# Patient Record
Sex: FEMALE | Race: WHITE | NOT HISPANIC OR LATINO | Employment: UNEMPLOYED | URBAN - METROPOLITAN AREA
[De-identification: names, ages, dates, MRNs, and addresses within clinical notes are randomized per-mention and may not be internally consistent; named-entity substitution may affect disease eponyms.]

---

## 2023-09-28 ENCOUNTER — HOSPITAL ENCOUNTER (INPATIENT)
Facility: HOSPITAL | Age: 43
LOS: 20 days | Discharge: HOME | DRG: 885 | End: 2023-10-18
Attending: STUDENT IN AN ORGANIZED HEALTH CARE EDUCATION/TRAINING PROGRAM | Admitting: STUDENT IN AN ORGANIZED HEALTH CARE EDUCATION/TRAINING PROGRAM
Payer: COMMERCIAL

## 2023-09-28 ENCOUNTER — HOSPITAL ENCOUNTER (EMERGENCY)
Facility: HOSPITAL | Age: 43
End: 2023-09-28
Attending: EMERGENCY MEDICINE
Payer: COMMERCIAL

## 2023-09-28 VITALS
TEMPERATURE: 98.2 F | OXYGEN SATURATION: 98 % | DIASTOLIC BLOOD PRESSURE: 75 MMHG | WEIGHT: 136 LBS | BODY MASS INDEX: 25.03 KG/M2 | SYSTOLIC BLOOD PRESSURE: 113 MMHG | RESPIRATION RATE: 16 BRPM | HEIGHT: 62 IN | HEART RATE: 86 BPM

## 2023-09-28 DIAGNOSIS — F41.9 ANXIETY: Primary | ICD-10-CM

## 2023-09-28 DIAGNOSIS — I10 HYPERTENSION: ICD-10-CM

## 2023-09-28 DIAGNOSIS — F32.A DEPRESSION: ICD-10-CM

## 2023-09-28 DIAGNOSIS — F31.9 BIPOLAR DISORDER (HCC): Primary | ICD-10-CM

## 2023-09-28 DIAGNOSIS — F41.9 ANXIETY: ICD-10-CM

## 2023-09-28 LAB
AMPHETAMINES SERPL QL SCN: NEGATIVE
BARBITURATES UR QL: NEGATIVE
BENZODIAZ UR QL: NEGATIVE
COCAINE UR QL: NEGATIVE
ETHANOL EXG-MCNC: 0 MG/DL
EXT PREGNANCY TEST URINE: NEGATIVE
EXT. CONTROL: NORMAL
OPIATES UR QL SCN: NEGATIVE
OXYCODONE+OXYMORPHONE UR QL SCN: NEGATIVE
PCP UR QL: NEGATIVE
THC UR QL: NEGATIVE

## 2023-09-28 PROCEDURE — 82075 ASSAY OF BREATH ETHANOL: CPT | Performed by: EMERGENCY MEDICINE

## 2023-09-28 PROCEDURE — 81025 URINE PREGNANCY TEST: CPT | Performed by: EMERGENCY MEDICINE

## 2023-09-28 PROCEDURE — 99284 EMERGENCY DEPT VISIT MOD MDM: CPT

## 2023-09-28 PROCEDURE — 80307 DRUG TEST PRSMV CHEM ANLYZR: CPT | Performed by: EMERGENCY MEDICINE

## 2023-09-28 PROCEDURE — 99285 EMERGENCY DEPT VISIT HI MDM: CPT | Performed by: EMERGENCY MEDICINE

## 2023-09-28 RX ORDER — LORAZEPAM 2 MG/ML
2 INJECTION INTRAMUSCULAR EVERY 6 HOURS PRN
Status: CANCELLED | OUTPATIENT
Start: 2023-09-28

## 2023-09-28 RX ORDER — HALOPERIDOL 5 MG/ML
2.5 INJECTION INTRAMUSCULAR
Status: DISCONTINUED | OUTPATIENT
Start: 2023-09-28 | End: 2023-10-18 | Stop reason: HOSPADM

## 2023-09-28 RX ORDER — BISACODYL 10 MG
10 SUPPOSITORY, RECTAL RECTAL DAILY PRN
Status: CANCELLED | OUTPATIENT
Start: 2023-09-28

## 2023-09-28 RX ORDER — PROPRANOLOL HCL 60 MG
60 CAPSULE, EXTENDED RELEASE 24HR ORAL DAILY
Status: ON HOLD | COMMUNITY

## 2023-09-28 RX ORDER — BISACODYL 10 MG
10 SUPPOSITORY, RECTAL RECTAL DAILY PRN
Status: DISCONTINUED | OUTPATIENT
Start: 2023-09-28 | End: 2023-10-18 | Stop reason: HOSPADM

## 2023-09-28 RX ORDER — HALOPERIDOL 5 MG/1
5 TABLET ORAL
Status: DISCONTINUED | OUTPATIENT
Start: 2023-09-28 | End: 2023-10-18 | Stop reason: HOSPADM

## 2023-09-28 RX ORDER — LORAZEPAM 2 MG/ML
1 INJECTION INTRAMUSCULAR
Status: CANCELLED | OUTPATIENT
Start: 2023-09-28

## 2023-09-28 RX ORDER — AMOXICILLIN 250 MG
1 CAPSULE ORAL DAILY PRN
Status: DISCONTINUED | OUTPATIENT
Start: 2023-09-28 | End: 2023-10-18 | Stop reason: HOSPADM

## 2023-09-28 RX ORDER — LANOLIN ALCOHOL/MO/W.PET/CERES
3 CREAM (GRAM) TOPICAL
Status: CANCELLED | OUTPATIENT
Start: 2023-09-28

## 2023-09-28 RX ORDER — TRAZODONE HYDROCHLORIDE 50 MG/1
50 TABLET ORAL
Status: CANCELLED | OUTPATIENT
Start: 2023-09-28

## 2023-09-28 RX ORDER — HALOPERIDOL 5 MG/1
2.5 TABLET ORAL
Status: DISCONTINUED | OUTPATIENT
Start: 2023-09-28 | End: 2023-10-18 | Stop reason: HOSPADM

## 2023-09-28 RX ORDER — HYDROXYZINE 50 MG/1
100 TABLET, FILM COATED ORAL
Status: DISCONTINUED | OUTPATIENT
Start: 2023-09-28 | End: 2023-09-29

## 2023-09-28 RX ORDER — HYDROXYZINE HYDROCHLORIDE 25 MG/1
50 TABLET, FILM COATED ORAL
Status: CANCELLED | OUTPATIENT
Start: 2023-09-28

## 2023-09-28 RX ORDER — LISINOPRIL 10 MG/1
10 TABLET ORAL DAILY
Status: DISCONTINUED | OUTPATIENT
Start: 2023-09-29 | End: 2023-10-18 | Stop reason: HOSPADM

## 2023-09-28 RX ORDER — HYDROXYZINE HYDROCHLORIDE 25 MG/1
100 TABLET, FILM COATED ORAL
Status: CANCELLED | OUTPATIENT
Start: 2023-09-28

## 2023-09-28 RX ORDER — HYDROXYZINE HYDROCHLORIDE 25 MG/1
25 TABLET, FILM COATED ORAL
Status: DISCONTINUED | OUTPATIENT
Start: 2023-09-28 | End: 2023-10-03

## 2023-09-28 RX ORDER — BENZTROPINE MESYLATE 1 MG/ML
0.5 INJECTION INTRAMUSCULAR; INTRAVENOUS
Status: DISCONTINUED | OUTPATIENT
Start: 2023-09-28 | End: 2023-10-18 | Stop reason: HOSPADM

## 2023-09-28 RX ORDER — TRAZODONE HYDROCHLORIDE 50 MG/1
50 TABLET ORAL
Status: DISCONTINUED | OUTPATIENT
Start: 2023-09-28 | End: 2023-10-18 | Stop reason: HOSPADM

## 2023-09-28 RX ORDER — HALOPERIDOL 1 MG/1
1 TABLET ORAL EVERY 6 HOURS PRN
Status: DISCONTINUED | OUTPATIENT
Start: 2023-09-28 | End: 2023-10-18 | Stop reason: HOSPADM

## 2023-09-28 RX ORDER — BENZTROPINE MESYLATE 0.5 MG/1
1 TABLET ORAL
Status: CANCELLED | OUTPATIENT
Start: 2023-09-28

## 2023-09-28 RX ORDER — ACETAMINOPHEN 325 MG/1
975 TABLET ORAL EVERY 6 HOURS PRN
Status: DISCONTINUED | OUTPATIENT
Start: 2023-09-28 | End: 2023-10-18 | Stop reason: HOSPADM

## 2023-09-28 RX ORDER — DIPHENHYDRAMINE HYDROCHLORIDE 50 MG/ML
50 INJECTION INTRAMUSCULAR; INTRAVENOUS EVERY 6 HOURS PRN
Status: DISCONTINUED | OUTPATIENT
Start: 2023-09-28 | End: 2023-10-03

## 2023-09-28 RX ORDER — LORAZEPAM 2 MG/ML
2 INJECTION INTRAMUSCULAR EVERY 6 HOURS PRN
Status: DISCONTINUED | OUTPATIENT
Start: 2023-09-28 | End: 2023-10-03

## 2023-09-28 RX ORDER — HYDROXYZINE 50 MG/1
50 TABLET, FILM COATED ORAL
Status: DISCONTINUED | OUTPATIENT
Start: 2023-09-28 | End: 2023-10-03

## 2023-09-28 RX ORDER — MAGNESIUM HYDROXIDE/ALUMINUM HYDROXICE/SIMETHICONE 120; 1200; 1200 MG/30ML; MG/30ML; MG/30ML
30 SUSPENSION ORAL EVERY 4 HOURS PRN
Status: DISCONTINUED | OUTPATIENT
Start: 2023-09-28 | End: 2023-10-18 | Stop reason: HOSPADM

## 2023-09-28 RX ORDER — ACETAMINOPHEN 325 MG/1
650 TABLET ORAL EVERY 4 HOURS PRN
Status: DISCONTINUED | OUTPATIENT
Start: 2023-09-28 | End: 2023-10-18 | Stop reason: HOSPADM

## 2023-09-28 RX ORDER — BENZTROPINE MESYLATE 1 MG/ML
1 INJECTION INTRAMUSCULAR; INTRAVENOUS
Status: DISCONTINUED | OUTPATIENT
Start: 2023-09-28 | End: 2023-10-18 | Stop reason: HOSPADM

## 2023-09-28 RX ORDER — ACETAMINOPHEN 325 MG/1
650 TABLET ORAL EVERY 4 HOURS PRN
Status: CANCELLED | OUTPATIENT
Start: 2023-09-28

## 2023-09-28 RX ORDER — ACETAMINOPHEN 325 MG/1
650 TABLET ORAL EVERY 6 HOURS PRN
Status: CANCELLED | OUTPATIENT
Start: 2023-09-28

## 2023-09-28 RX ORDER — LORAZEPAM 2 MG/ML
2 INJECTION INTRAMUSCULAR
Status: DISCONTINUED | OUTPATIENT
Start: 2023-09-28 | End: 2023-10-18 | Stop reason: HOSPADM

## 2023-09-28 RX ORDER — ACETAMINOPHEN 325 MG/1
975 TABLET ORAL EVERY 6 HOURS PRN
Status: CANCELLED | OUTPATIENT
Start: 2023-09-28

## 2023-09-28 RX ORDER — HALOPERIDOL 10 MG/1
5 TABLET ORAL
Status: CANCELLED | OUTPATIENT
Start: 2023-09-28

## 2023-09-28 RX ORDER — POLYETHYLENE GLYCOL 3350 17 G/17G
17 POWDER, FOR SOLUTION ORAL DAILY PRN
Status: CANCELLED | OUTPATIENT
Start: 2023-09-28

## 2023-09-28 RX ORDER — LANOLIN ALCOHOL/MO/W.PET/CERES
3 CREAM (GRAM) TOPICAL
Status: DISCONTINUED | OUTPATIENT
Start: 2023-09-28 | End: 2023-09-29

## 2023-09-28 RX ORDER — POLYETHYLENE GLYCOL 3350 17 G/17G
17 POWDER, FOR SOLUTION ORAL DAILY PRN
Status: DISCONTINUED | OUTPATIENT
Start: 2023-09-28 | End: 2023-10-18 | Stop reason: HOSPADM

## 2023-09-28 RX ORDER — ACETAMINOPHEN 325 MG/1
650 TABLET ORAL EVERY 6 HOURS PRN
Status: DISCONTINUED | OUTPATIENT
Start: 2023-09-28 | End: 2023-10-18 | Stop reason: HOSPADM

## 2023-09-28 RX ORDER — LISINOPRIL 10 MG/1
10 TABLET ORAL DAILY
Status: CANCELLED | OUTPATIENT
Start: 2023-09-29

## 2023-09-28 RX ORDER — LORAZEPAM 1 MG/1
1 TABLET ORAL ONCE
Status: COMPLETED | OUTPATIENT
Start: 2023-09-28 | End: 2023-09-28

## 2023-09-28 RX ORDER — BENZTROPINE MESYLATE 1 MG/ML
1 INJECTION INTRAMUSCULAR; INTRAVENOUS
Status: CANCELLED | OUTPATIENT
Start: 2023-09-28

## 2023-09-28 RX ORDER — LISINOPRIL 10 MG/1
10 TABLET ORAL DAILY
Status: ON HOLD | COMMUNITY
Start: 2023-08-05

## 2023-09-28 RX ORDER — LORAZEPAM 2 MG/ML
1 INJECTION INTRAMUSCULAR
Status: DISCONTINUED | OUTPATIENT
Start: 2023-09-28 | End: 2023-10-18 | Stop reason: HOSPADM

## 2023-09-28 RX ORDER — AMOXICILLIN 250 MG
1 CAPSULE ORAL DAILY PRN
Status: CANCELLED | OUTPATIENT
Start: 2023-09-28

## 2023-09-28 RX ORDER — HYDROXYZINE HYDROCHLORIDE 25 MG/1
25 TABLET, FILM COATED ORAL
Status: CANCELLED | OUTPATIENT
Start: 2023-09-28

## 2023-09-28 RX ORDER — BENZTROPINE MESYLATE 1 MG/1
1 TABLET ORAL
Status: DISCONTINUED | OUTPATIENT
Start: 2023-09-28 | End: 2023-10-18 | Stop reason: HOSPADM

## 2023-09-28 RX ORDER — DIPHENHYDRAMINE HYDROCHLORIDE 50 MG/ML
50 INJECTION INTRAMUSCULAR; INTRAVENOUS EVERY 6 HOURS PRN
Status: CANCELLED | OUTPATIENT
Start: 2023-09-28

## 2023-09-28 RX ORDER — HALOPERIDOL 5 MG/ML
5 INJECTION INTRAMUSCULAR
Status: CANCELLED | OUTPATIENT
Start: 2023-09-28

## 2023-09-28 RX ORDER — BENZTROPINE MESYLATE 1 MG/ML
0.5 INJECTION INTRAMUSCULAR; INTRAVENOUS
Status: CANCELLED | OUTPATIENT
Start: 2023-09-28

## 2023-09-28 RX ORDER — HALOPERIDOL 2 MG/1
1 TABLET ORAL EVERY 6 HOURS PRN
Status: CANCELLED | OUTPATIENT
Start: 2023-09-28

## 2023-09-28 RX ORDER — HALOPERIDOL 5 MG/ML
2.5 INJECTION INTRAMUSCULAR
Status: CANCELLED | OUTPATIENT
Start: 2023-09-28

## 2023-09-28 RX ORDER — HALOPERIDOL 5 MG/ML
5 INJECTION INTRAMUSCULAR
Status: DISCONTINUED | OUTPATIENT
Start: 2023-09-28 | End: 2023-10-18 | Stop reason: HOSPADM

## 2023-09-28 RX ORDER — MAGNESIUM HYDROXIDE/ALUMINUM HYDROXICE/SIMETHICONE 120; 1200; 1200 MG/30ML; MG/30ML; MG/30ML
30 SUSPENSION ORAL EVERY 4 HOURS PRN
Status: CANCELLED | OUTPATIENT
Start: 2023-09-28

## 2023-09-28 RX ORDER — LORAZEPAM 2 MG/ML
2 INJECTION INTRAMUSCULAR
Status: CANCELLED | OUTPATIENT
Start: 2023-09-28

## 2023-09-28 RX ADMIN — LORAZEPAM 1 MG: 1 TABLET ORAL at 18:08

## 2023-09-28 RX ADMIN — TRAZODONE HYDROCHLORIDE 50 MG: 50 TABLET ORAL at 22:06

## 2023-09-28 NOTE — ED NOTES
44 y.o female presented to the ED with depression and anxiety. Pt stated worsening depression and anxiety since the birth of her child for the past year and a half. Pt stated she has no motivation and energy to do daily task and to take care of her child. Pt's has a supportive  who helps take care of the Child. Patient loss of employment is another stressor. Pt stated she had trauma as a child. Pt stated she hasn't been taking her psych medications for the past two months. Pt stated her medcations have not been effective since the birth of her child. Pt denies SI/HI and A/V hallucinations. Pt was inpatient for mental health x2 at [facility] where pt was diagnosed with Bipolar. Pt currently has no outpatient Psychiatrist or Therapist. Pt denies any legal or substance abuse. Pt stated she wants to go inpatient and will sign a [place] Provider is in agreement with the 201 for inpatient treatment.

## 2023-09-28 NOTE — ED NOTES
Insurance Authorization for admission:   Clinicals and Authorization Request Form faxed to [facility] ([phone]) Clinical Services Utilization Management for Insurance prior Auth.

## 2023-09-28 NOTE — ED NOTES
Patient is accepted at [facility]. Patient is accepted by [facility] per Black Munoz. Transportation is arranged with [facility]. Transportation is scheduled for 2130 CTS. Patient may go to the floor at 2130. Nurse report is to be called to [phone] prior to patient transfer.

## 2023-09-28 NOTE — ED PROVIDER NOTES
History  Chief Complaint   Patient presents with    Depression     Patient presents to the ED with c/o depression and anxiety for the past year and a half since the birth of her child. States she has not been taking her psych meds for the past two months, denies SI or HI      Patient is 44year old female who presents with worsening anxiety and depression. Patient reports that she has been struggling since having the baby. She has a history of anxiety and depression that has been worsening despite an inpatient hospitalization and multiple medication adjustments. Reports that her psychiatrist recommended that she go inpatient at this time. Patient denies any suicidal or homicidal ideations. She denies any auditory visual hallucinations. She states that her anxiety has made her debilitated and she feels like she cannot do anything. She states that she is not bonding with her child, but does not have any thoughts of harming the child. Prior to Admission Medications   Prescriptions Last Dose Informant Patient Reported? Taking?   lisinopril (ZESTRIL) 10 mg tablet   Yes Yes   Sig: Take 10 mg by mouth daily   propranolol (INDERAL LA) 60 mg 24 hr capsule   Yes Yes   Sig: Take 60 mg by mouth daily      Facility-Administered Medications: None       Past Medical History:   Diagnosis Date    Psychiatric disorder        History reviewed. No pertinent surgical history. History reviewed. No pertinent family history. I have reviewed and agree with the history as documented. E-Cigarette/Vaping     E-Cigarette/Vaping Substances     Social History     Tobacco Use    Smoking status: Never    Smokeless tobacco: Never   Substance Use Topics    Alcohol use: Not Currently    Drug use: Not Currently       Review of Systems   Psychiatric/Behavioral:  Negative for suicidal ideas. Physical Exam  Physical Exam  Vitals and nursing note reviewed. Constitutional:       General: She is not in acute distress. Appearance: Normal appearance. She is not ill-appearing, toxic-appearing or diaphoretic. HENT:      Head: Normocephalic and atraumatic. Mouth/Throat:      Mouth: Mucous membranes are moist.   Eyes:      Conjunctiva/sclera: Conjunctivae normal.      Pupils: Pupils are equal, round, and reactive to light. Cardiovascular:      Rate and Rhythm: Normal rate and regular rhythm. Pulses: Normal pulses. Heart sounds: Normal heart sounds. No murmur heard. Pulmonary:      Effort: Pulmonary effort is normal. No respiratory distress. Breath sounds: Normal breath sounds. No stridor. No wheezing, rhonchi or rales. Chest:      Chest wall: No tenderness. Skin:     General: Skin is warm and dry. Neurological:      General: No focal deficit present. Mental Status: She is alert and oriented to person, place, and time. Mental status is at baseline. Psychiatric:         Mood and Affect: Affect is flat and tearful. Thought Content: Thought content does not include homicidal or suicidal ideation. Thought content does not include homicidal or suicidal plan.          Vital Signs  ED Triage Vitals [09/28/23 1500]   Temperature Pulse Respirations Blood Pressure SpO2   98.2 °F (36.8 °C) 85 18 130/80 100 %      Temp Source Heart Rate Source Patient Position - Orthostatic VS BP Location FiO2 (%)   Temporal Monitor Sitting Left arm --      Pain Score       --           Vitals:    09/28/23 1500 09/28/23 1623   BP: 130/80 122/86   Pulse: 85 73   Patient Position - Orthostatic VS: Sitting Sitting         Visual Acuity      ED Medications  Medications   LORazepam (ATIVAN) tablet 1 mg (has no administration in time range)       Diagnostic Studies  Results Reviewed       Procedure Component Value Units Date/Time    Rapid drug screen, urine [[id number]] Collected: 09/28/23 1537    Lab Status: Final result Specimen: Urine, Clean Catch Updated: 09/28/23 1631     Amph/Meth UR Negative     Barbiturate Ur Negative Benzodiazepine Urine Negative     Cocaine Urine Negative     Methadone Urine --     Opiate Urine Negative     PCP Ur Negative     THC Urine Negative     Oxycodone Urine Negative    Narrative:      No Methadone test performed; if required call laboratory. FOR MEDICAL PURPOSES ONLY. IF CONFIRMATION NEEDED PLEASE CONTACT THE LAB WITHIN 5 DAYS. Drug Screen Cutoff Levels:  AMPHETAMINE/METHAMPHETAMINES  1000 ng/mL  BARBITURATES     200 ng/mL  BENZODIAZEPINES     200 ng/mL  COCAINE      300 ng/mL  METHADONE      300 ng/mL  OPIATES      300 ng/mL  PHENCYCLIDINE     25 ng/mL  THC       50 ng/mL  OXYCODONE      100 ng/mL    POCT pregnancy, urine [[id number]]  (Normal) Resulted: 09/28/23 1554    Lab Status: Final result Updated: 09/28/23 1554     EXT Preg Test, Ur Negative     Control Valid    POCT alcohol breath test [[id number]]  (Normal) Resulted: 09/28/23 1505    Lab Status: Final result Updated: 09/28/23 1505     EXTBreath Alcohol 0.000                   No orders to display              Procedures  Procedures         ED Course  ED Course as of 09/28/23 1808   Thu Sep 28, 2023   1637 201 signed. SBIRT 22yo+      Flowsheet Row Most Recent Value   Initial Alcohol Screen: US AUDIT-C     1. How often do you have a drink containing alcohol? 0 Filed at: 09/28/2023 1504   2. How many drinks containing alcohol do you have on a typical day you are drinking? 0 Filed at: 09/28/2023 1504   3a. Male UNDER 65: How often do you have five or more drinks on one occasion? 0 Filed at: 09/28/2023 1504   3b. FEMALE Any Age, or MALE 65+: How often do you have 4 or more drinks on one occassion? 0 Filed at: 09/28/2023 1504   Audit-C Score 0 Filed at: 09/28/2023 1504   JANEEN: How many times in the past year have you. .. Used an illegal drug or used a prescription medication for non-medical reasons?  Never Filed at: 09/28/2023 1504                      Medical Decision Making  Assessment and Plan:   Worsening anxiety and depression. Has gone through multiple med adjustments which are not working according to the patient. Patient's psychiatrist recommending that she go inpatient. Patient amenable. No grounds for involuntary commitment. Of medical records from outside hospital shows that in November 2022 patient had diagnoses of bipolar disorder, postpartum depression, failure to thrive in an adult, delusions. Amount and/or Complexity of Data Reviewed  Labs: ordered. Risk  Prescription drug management. Decision regarding hospitalization. Disposition  Final diagnoses:   Anxiety   Depression     Time reflects when diagnosis was documented in both MDM as applicable and the Disposition within this note       Time User Action Codes Description Comment    9/28/2023  3:33 PM Jayne Harrison [F41.9] Anxiety     9/28/2023  3:33 PM Harman Juares Add [place]. A] Depression           ED Disposition       ED Disposition   Transfer to Behavioral Health Condition   --    Date/Time   Thu Sep 28, 2023 1533    Comment   Albino Flores has been medically cleared and is pending crisis evaluation.                MD Documentation      [facility] Most Recent Value   Patient Condition The patient has been stabilized such that within reasonable medical probability, no material deterioration of the patient condition or the condition of the unborn child(bernie) is likely to result from the transfer   Reason for Transfer Level of Care needed not available at this facility   Benefits of Transfer Specialized equipment and/or services available at the receiving facility (Include comment)________________________   Risks of Transfer Potential for delay in receiving treatment   Accepting Physician Dr Lucia Khan Name, Bob Law [place]    (Name & Tel number) [facility]   Sending MD Dr Harman Juares, DO   Provider Certification The patient is stable for psychiatric transfer because they are medically stable, and is protected from harming him/herself or others during transport          RN Documentation      [address] Name, [address], [place]    (Name & Tel number) [facility]          Follow-up Information    None         Patient's Medications   Discharge Prescriptions    No medications on file       No discharge procedures on file.     PDMP Review       None            ED Provider  Electronically Signed by             Axel Alcaraz,   09/28/23 [address] [place] [address]  09/28/23 1936

## 2023-09-28 NOTE — EMTALA/ACUTE CARE TRANSFER
[facility] [facility] EMERGENCY DEPARTMENT  [address]  [facility] [address]  Dept: [phone]      MIRLNI TRANSFER CONSENT    NAME Chasidy Parish                                         1980                              MRN [id number]    I have been informed of my rights regarding examination, treatment, and transfer   by Dr. Juana Cohen DO    Benefits: Specialized equipment and/or services available at the receiving facility (Include comment)________________________    Risks: Potential for delay in receiving treatment      Consent for Transfer:  I acknowledge that my medical condition has been evaluated and explained to me by the emergency department physician or other qualified medical person and/or my attending physician, who has recommended that I be transferred to the service of  Accepting Physician: Dr Yonatan Leo at [address] [place] [address] [address] Name, [address] [place] [address] Street : [place], [place]. The above potential benefits of such transfer, the potential risks associated with such transfer, and the probable risks of not being transferred have been explained to me, and I fully understand them. The doctor has explained that, in my case, the benefits of transfer outweigh the risks. I agree to be transferred. I authorize the performance of emergency medical procedures and treatments upon me in both transit and upon arrival at the receiving facility. Additionally, I authorize the release of any and all medical records to the receiving facility and request they be transported with me, if possible. I understand that the safest mode of transportation during a medical emergency is an ambulance and that the Hospital advocates the use of this mode of transport. Risks of traveling to the receiving facility by car, including absence of medical control, life sustaining equipment, such as oxygen, and medical personnel has been explained to me and I fully understand them.     ([address])  [ ]  I consent to the stated transfer and to be transported by ambulance/helicopter. [  ]  I consent to the stated transfer, but refuse transportation by ambulance and accept full responsibility for my transportation by car. I understand the risks of non-ambulance transfers and I exonerate the Hospital and its staff from any deterioration in my condition that results from this refusal.    X___________________________________________    DATE  23  TIME________  Signature of patient or legally responsible individual signing on patient behalf           RELATIONSHIP TO PATIENT_________________________          Provider Certification    NAME Storm Charlton                                         1980                              MRN [id number]    A medical screening exam was performed on the above named patient. Based on the examination:    Condition Necessitating Transfer The primary encounter diagnosis was Anxiety. A diagnosis of Depression was also pertinent to this visit. Patient Condition: The patient has been stabilized such that within reasonable medical probability, no material deterioration of the patient condition or the condition of the unborn child(bernie) is likely to result from the transfer    Reason for Transfer: Level of Care needed not available at this facility    Transfer Requirements: [address], [address] available and qualified personnel available for treatment as acknowledged by Rosaline  Agreed to accept transfer and to provide appropriate medical treatment as acknowledged by       Dr Garrick Kennedy  Appropriate medical records of the examination and treatment of the patient are provided at the time of transfer   [address] _______  Transfer will be performed by qualified personnel from    and appropriate transfer equipment as required, including the use of necessary and appropriate life support measures.     Provider Certification: I have examined the patient and explained the following risks and benefits of being transferred/refusing transfer to the patient/family:  The patient is stable for psychiatric transfer because they are medically stable, and is protected from harming him/herself or others during transport      Based on these reasonable risks and benefits to the patient and/or the unborn child(bernie), and based upon the information available at the time of the patient’s examination, I certify that the medical benefits reasonably to be expected from the provision of appropriate medical treatments at another medical facility outweigh the increasing risks, if any, to the individual’s medical condition, and in the case of labor to the unborn child, from effecting the transfer.     X____________________________________________ DATE 09/28/23        TIME_______      ORIGINAL - SEND TO MEDICAL RECORDS   COPY - SEND WITH PATIENT DURING TRANSFER

## 2023-09-29 PROBLEM — N76.0 BV (BACTERIAL VAGINOSIS): Status: ACTIVE | Noted: 2023-09-29

## 2023-09-29 PROBLEM — Z00.8 MEDICAL CLEARANCE FOR PSYCHIATRIC ADMISSION: Status: ACTIVE | Noted: 2023-09-29

## 2023-09-29 PROBLEM — F41.0 GENERALIZED ANXIETY DISORDER WITH PANIC ATTACKS: Status: ACTIVE | Noted: 2023-09-29

## 2023-09-29 PROBLEM — B96.89 BV (BACTERIAL VAGINOSIS): Status: ACTIVE | Noted: 2023-09-29

## 2023-09-29 PROBLEM — F31.9 BIPOLAR DISORDER (HCC): Status: ACTIVE | Noted: 2018-01-01

## 2023-09-29 PROBLEM — E78.00 HYPERCHOLESTEREMIA: Status: ACTIVE | Noted: 2023-09-29

## 2023-09-29 PROBLEM — I10 HYPERTENSION: Status: ACTIVE | Noted: 2018-01-01

## 2023-09-29 PROBLEM — F41.1 GENERALIZED ANXIETY DISORDER WITH PANIC ATTACKS: Status: ACTIVE | Noted: 2023-09-29

## 2023-09-29 LAB
25(OH)D3 SERPL-MCNC: 27.3 NG/ML (ref 30–100)
ALBUMIN SERPL BCP-MCNC: 3.9 G/DL (ref 3.5–5)
ALP SERPL-CCNC: 72 U/L (ref 34–104)
ALT SERPL W P-5'-P-CCNC: 9 U/L (ref 7–52)
ANION GAP SERPL CALCULATED.3IONS-SCNC: 8 MMOL/L
AST SERPL W P-5'-P-CCNC: 15 U/L (ref 13–39)
BACTERIA UR QL AUTO: ABNORMAL /HPF
BASOPHILS # BLD AUTO: 0.07 THOUSANDS/ÂΜL (ref 0–0.1)
BASOPHILS NFR BLD AUTO: 1 % (ref 0–1)
BILIRUB SERPL-MCNC: 0.78 MG/DL (ref 0.2–1)
BILIRUB UR QL STRIP: NEGATIVE
BUN SERPL-MCNC: 15 MG/DL (ref 5–25)
CALCIUM SERPL-MCNC: 9.8 MG/DL (ref 8.4–10.2)
CHLORIDE SERPL-SCNC: 104 MMOL/L (ref 96–108)
CHOLEST SERPL-MCNC: 234 MG/DL
CLARITY UR: ABNORMAL
CO2 SERPL-SCNC: 24 MMOL/L (ref 21–32)
COLOR UR: YELLOW
CREAT SERPL-MCNC: 0.94 MG/DL (ref 0.6–1.3)
EOSINOPHIL # BLD AUTO: 0.13 THOUSAND/ÂΜL (ref 0–0.61)
EOSINOPHIL NFR BLD AUTO: 3 % (ref 0–6)
ERYTHROCYTE [DISTWIDTH] IN BLOOD BY AUTOMATED COUNT: 14.7 % (ref 11.6–15.1)
FOLATE SERPL-MCNC: >22.3 NG/ML
GFR SERPL CREATININE-BSD FRML MDRD: 75 ML/MIN/1.73SQ M
GLUCOSE P FAST SERPL-MCNC: 93 MG/DL (ref 65–99)
GLUCOSE SERPL-MCNC: 93 MG/DL (ref 65–140)
GLUCOSE UR STRIP-MCNC: NEGATIVE MG/DL
HCT VFR BLD AUTO: 43.9 % (ref 34.8–46.1)
HDLC SERPL-MCNC: 68 MG/DL
HGB BLD-MCNC: 14 G/DL (ref 11.5–15.4)
HGB UR QL STRIP.AUTO: NEGATIVE
IMM GRANULOCYTES # BLD AUTO: 0.02 THOUSAND/UL (ref 0–0.2)
IMM GRANULOCYTES NFR BLD AUTO: 0 % (ref 0–2)
KETONES UR STRIP-MCNC: NEGATIVE MG/DL
LDLC SERPL CALC-MCNC: 152 MG/DL (ref 0–100)
LEUKOCYTE ESTERASE UR QL STRIP: ABNORMAL
LYMPHOCYTES # BLD AUTO: 1.45 THOUSANDS/ÂΜL (ref 0.6–4.47)
LYMPHOCYTES NFR BLD AUTO: 29 % (ref 14–44)
MCH RBC QN AUTO: 29 PG (ref 26.8–34.3)
MCHC RBC AUTO-ENTMCNC: 31.9 G/DL (ref 31.4–37.4)
MCV RBC AUTO: 91 FL (ref 82–98)
MONOCYTES # BLD AUTO: 0.4 THOUSAND/ÂΜL (ref 0.17–1.22)
MONOCYTES NFR BLD AUTO: 8 % (ref 4–12)
NEUTROPHILS # BLD AUTO: 3 THOUSANDS/ÂΜL (ref 1.85–7.62)
NEUTS SEG NFR BLD AUTO: 59 % (ref 43–75)
NITRITE UR QL STRIP: NEGATIVE
NON-SQ EPI CELLS URNS QL MICRO: ABNORMAL /HPF
NONHDLC SERPL-MCNC: 166 MG/DL
NRBC BLD AUTO-RTO: 0 /100 WBCS
PH UR STRIP.AUTO: 5.5 [PH]
PLATELET # BLD AUTO: 309 THOUSANDS/UL (ref 149–390)
PMV BLD AUTO: 9.8 FL (ref 8.9–12.7)
POTASSIUM SERPL-SCNC: 4 MMOL/L (ref 3.5–5.3)
PROT SERPL-MCNC: 7.2 G/DL (ref 6.4–8.4)
PROT UR STRIP-MCNC: NEGATIVE MG/DL
RBC # BLD AUTO: 4.82 MILLION/UL (ref 3.81–5.12)
RBC #/AREA URNS AUTO: ABNORMAL /HPF
SODIUM SERPL-SCNC: 136 MMOL/L (ref 135–147)
SP GR UR STRIP.AUTO: 1.02 (ref 1–1.03)
TRIGL SERPL-MCNC: 70 MG/DL
TSH SERPL DL<=0.05 MIU/L-ACNC: 1.51 UIU/ML (ref 0.45–4.5)
UROBILINOGEN UR STRIP-ACNC: <2 MG/DL
VIT B12 SERPL-MCNC: 500 PG/ML (ref 180–914)
WBC # BLD AUTO: 5.07 THOUSAND/UL (ref 4.31–10.16)
WBC #/AREA URNS AUTO: ABNORMAL /HPF

## 2023-09-29 PROCEDURE — 99222 1ST HOSP IP/OBS MODERATE 55: CPT | Performed by: PHYSICIAN ASSISTANT

## 2023-09-29 PROCEDURE — 80053 COMPREHEN METABOLIC PANEL: CPT | Performed by: PSYCHIATRY & NEUROLOGY

## 2023-09-29 PROCEDURE — 82607 VITAMIN B-12: CPT | Performed by: PSYCHIATRY & NEUROLOGY

## 2023-09-29 PROCEDURE — 81001 URINALYSIS AUTO W/SCOPE: CPT | Performed by: PSYCHIATRY & NEUROLOGY

## 2023-09-29 PROCEDURE — 82746 ASSAY OF FOLIC ACID SERUM: CPT | Performed by: PSYCHIATRY & NEUROLOGY

## 2023-09-29 PROCEDURE — 99222 1ST HOSP IP/OBS MODERATE 55: CPT | Performed by: STUDENT IN AN ORGANIZED HEALTH CARE EDUCATION/TRAINING PROGRAM

## 2023-09-29 PROCEDURE — 82306 VITAMIN D 25 HYDROXY: CPT | Performed by: PSYCHIATRY & NEUROLOGY

## 2023-09-29 PROCEDURE — 85025 COMPLETE CBC W/AUTO DIFF WBC: CPT | Performed by: PSYCHIATRY & NEUROLOGY

## 2023-09-29 PROCEDURE — 84443 ASSAY THYROID STIM HORMONE: CPT | Performed by: PSYCHIATRY & NEUROLOGY

## 2023-09-29 PROCEDURE — 80061 LIPID PANEL: CPT | Performed by: PSYCHIATRY & NEUROLOGY

## 2023-09-29 RX ORDER — PROPRANOLOL HCL 60 MG
60 CAPSULE, EXTENDED RELEASE 24HR ORAL DAILY
Status: DISCONTINUED | OUTPATIENT
Start: 2023-09-29 | End: 2023-10-18 | Stop reason: HOSPADM

## 2023-09-29 RX ORDER — LORAZEPAM 0.5 MG/1
0.5 TABLET ORAL EVERY 8 HOURS PRN
Status: DISCONTINUED | OUTPATIENT
Start: 2023-09-29 | End: 2023-10-03

## 2023-09-29 RX ORDER — DESVENLAFAXINE SUCCINATE 50 MG/1
50 TABLET, EXTENDED RELEASE ORAL DAILY
Status: DISCONTINUED | OUTPATIENT
Start: 2023-09-29 | End: 2023-10-18 | Stop reason: HOSPADM

## 2023-09-29 RX ADMIN — CARIPRAZINE 1.5 MG: 1.5 CAPSULE, GELATIN COATED ORAL at 11:23

## 2023-09-29 RX ADMIN — HYDROXYZINE HYDROCHLORIDE 50 MG: 50 TABLET, FILM COATED ORAL at 09:27

## 2023-09-29 RX ADMIN — DESVENLAFAXINE 50 MG: 50 TABLET, FILM COATED, EXTENDED RELEASE ORAL at 11:23

## 2023-09-29 RX ADMIN — TRAZODONE HYDROCHLORIDE 50 MG: 50 TABLET ORAL at 21:12

## 2023-09-29 RX ADMIN — PROPRANOLOL HYDROCHLORIDE 60 MG: 60 CAPSULE, EXTENDED RELEASE ORAL at 11:23

## 2023-09-29 RX ADMIN — LISINOPRIL 10 MG: 10 TABLET ORAL at 09:05

## 2023-09-29 NOTE — ASSESSMENT & PLAN NOTE
Reports she started treatment for this 1 month ago and believes it is still present  Advised outpatient follow-up with OB/GYN adherence to Flagyl regimen  Given invasiveness of testing and patient's current mental state, will forego additional testing while admitted to inpatient behavioral health unit

## 2023-09-29 NOTE — PROGRESS NOTES
New admission. [facility]. Reported increased anxiety and depression which increased after birth of child in September. Pt reported her  is very supportive and taking care of baby. Pt denies SI but reported "feeling down." Pt reported she is interested in ECT. DC: TBD      09/29/23 0756   Team Meeting   Meeting Type Daily Rounds   Team Members Present   Team Members Present Physician;Nurse;; Other (Discipline and Name)   Physician Team Member Dr. Eduardo Corona / Dr. Brenda Gutiérrez / Loida Sullivan / Ariel Majano Team Member Cali Sosa / [facility] Management Team Member Arcadio Amos / Smita Beckman / Mell Hatch   Other (Discipline and Name) Sigmund - Group Facilitator   Patient/Family Present   Patient Present No   Patient's Family Present No

## 2023-09-29 NOTE — ASSESSMENT & PLAN NOTE
No admission labs available  CBC, UA, CMP, vitamin B12, folate, vitamin D 25-hydroxy, lipid panel, TSH labs pending  Vitals stable  UDS negative  COVID-19 negative  No EKG available  Medically stable for continued inpatient psychiatric treatment based on available results  Please contact [facility] with any questions or concerns

## 2023-09-29 NOTE — CMS CERTIFICATION NOTE
Recertification: Based upon physical, mental and social evaluations, I certify that inpatient psychiatric services continue to be medically necessary for this patient for a duration of 7 midnights for the treatment of  Bipolar disorder [facility])   Available alternative community resources still do not meet the patient's mental health care needs. I further attest that an established written individualized plan of care has been updated and is outlined in the patient's medical records.

## 2023-09-29 NOTE — DISCHARGE INSTR - OTHER ORDERS
[address] [facility],[address] [facility] its Crisis and Emergency Services to Daryl Brito and [address] telephone and walk-in component operates from [facility], [facility]. The 24-hour crisis telephone number is [phone]. The mobile component of the operation operates where the Crisis is occurring. About [address] [facility] HELPLINE: [phone]   Super Vitamin D. Foradian      MICHAELLE, the [employer] on Mental Illness, is the nation's largest grassroots mental health organization dedicated to building better lives for the millions of Americans affected by mental illness. [address] is an affiliate of [employer] and dedicated volunteers, members and leaders work tirelessly to raise awareness and provide essential education, advocacy and support group programs for people in our community living with mental illness and their loved ones. MICHAELLE started as a small group of families gathered around a kitchen table in the 1970's but has now grown into the nation's leading voice on mental health. All of our services are free-of-charge. [facility] relies on gifts and contributions to support our important work. MICHAELLE Support Groups  We remain committed to providing you with the support, education, and resources you need to build better lives. The same Group Guidelines and Principles of Support apply for in-person meetings and online meetings. Each MICHAELLE Support Group is led by two MICHAELLE-trained facilitators who are either family members or individuals in recovery themselves. These groups are offered free of charge, as are all [employer]. We have taken measures to ensure the same support you receive in times of wellness, you receive in times of illness. There is help, there is hope, and until there's a cure, there's MICHAELLE.          Family Support Group meets in person on the 2nd Wednesday of each month at [facility], [address]. Park in [employer] lot off [address]. Enter rear glass doors by MICHAELLE sign. The group meets from 7:00pm-8:30pm.  This Support Group is free, confidential, and open to adults age 25 and older who have a family member or loved one with a mental health condition. An RSVP is not required if you plan to attend in person. If you are unable to attend the meeting in person you can also attend by Zoom online or by phone. For questions call [phone] or email [email or web address]. Meets The 2nd And 4th Thursdays, 6:00pm To 7:30pm  Location: [address] [place] [address]. Enter Rear Glass Doors On [place]. Parking Is Available On-Street Or In [address] [facility]; Enter Lot From [employer]. Sarah Sevilla is a peer recovery support group for individuals 18 and over who experience mental health conditions that offers respect, understanding, encouragement, and hope. MICHAELLE Connection groups are:  Free  Held 2nd and 4th Thursday for 90 minutes  Designed to connect, encourage, and support participants  Led by trained facilitators living in recovery themselves  An RSVP is not required. If you are interested in attending or have questions contact: [phone] or [email or web address]

## 2023-09-29 NOTE — PLAN OF CARE
Problem: Ineffective Coping  Goal: Cooperates with admission process  Description: Interventions:   - Complete admission process  Outcome: Completed     Problem: Depression  Goal: Treatment Goal: Demonstrate behavioral control of depressive symptoms, verbalize feelings of improved mood/affect, and adopt new coping skills prior to discharge  Outcome: Progressing  Goal: Verbalize thoughts and feelings  Description: Interventions:  - Assess and re-assess patient's level of risk   - Engage patient in 1:1 interactions, daily, for a minimum of 15 minutes   - Encourage patient to express feelings, fears, frustrations, hopes   Outcome: Progressing  Goal: Refrain from harming self  Description: Interventions:  - Monitor patient closely, per order   - Supervise medication ingestion, monitor effects and side effects   Outcome: Progressing  Goal: Refrain from isolation  Description: Interventions:  - Develop a trusting relationship   - Encourage socialization   Outcome: Progressing  Goal: Refrain from self-neglect  Outcome: Progressing  Goal: Attend and participate in unit activities, including therapeutic, recreational, and educational groups  Description: Interventions:  - Provide therapeutic and educational activities daily, encourage attendance and participation, and document same in the medical record   Outcome: Progressing  Goal: Complete daily ADLs, including personal hygiene independently, as able  Description: Interventions:  - Observe, teach, and assist patient with ADLS  -  Monitor and promote a balance of rest/activity, with adequate nutrition and elimination   Outcome: Progressing

## 2023-09-29 NOTE — DISCHARGE INSTR - APPOINTMENTS
You will be discharged to [address]   You confirmed that your cell phone number is [phone]            Lashawn Razo or Sara, remi Rutledge, will be calling you after your discharge, on the phone number that you provided. They will be available as an additional support, if needed. If you wish to speak with one of them, you may contact Lashawn Razo at [phone] or Thanh Prado at [phone].

## 2023-09-29 NOTE — CONSULTS
[address]  Consult  Name: Marti Black 44 y.o. female I MRN: [id number]  Unit/Bed#: -02 I Date of Admission: 9/28/2023   Date of Service: 9/29/2023 I Hospital Day: 1    Inpatient consult for Medical Clearance for [facility] patient  Consult performed by: Kate Miller PA-C  Consult ordered by: Katelynn Cooney MD          Assessment/Plan   Medical clearance for psychiatric admission  Assessment & Plan  No admission labs available  CBC, UA, CMP, vitamin B12, folate, vitamin D 25-hydroxy, lipid panel, TSH labs pending  Vitals stable  UDS negative  COVID-19 negative  No EKG available  Medically stable for continued inpatient psychiatric treatment based on available results  Please contact SLIM with any questions or concerns    BV (bacterial vaginosis)  Assessment & Plan  Reports she started treatment for this 1 month ago and believes it is still present  Advised outpatient follow-up with OB/GYN adherence to Flagyl regimen  Given invasiveness of testing and patient's current mental state, will forego additional testing while admitted to inpatient behavioral health unit    Hypertension  Assessment & Plan  Maintained on lisinopril and propranolol, continue  Monitor blood pressure per unit protocol    Bipolar disorder [facility])  Assessment & Plan  Management per psychiatry       VTE Prophylaxis: VTE Score: 2 Low Risk (Score 0-2) - Encourage Ambulation. Recommendations for Discharge:  Discharge timeline per primary team.  Routine follow-up with primary care provider.  on cessation from. Total Time Spent on Date of Encounter in care of patient: 35 mins.  This time was spent on one or more of the following: performing physical exam; counseling and coordination of care; obtaining or reviewing history; documenting in the medical record; reviewing/ordering tests, medications or procedures; communicating with other healthcare professionals and discussing with patient's family/caregivers. Collaboration of Care: Were Recommendations Directly Discussed with Primary Treatment Team? No     History of Present Illness:  Armando Galan is a 44 y.o. female who is originally admitted to the behavioral health service due to worsening anxiety and depression. We are consulted for chronic medical conditions. Patient denies any chronic medical conditions for which she takes daily medications. Patient should continue all prior to admission medications as prescribed by primary care provider/outpatient specialists. Patient denies recent travel, illness or sick contacts. Patient denies smoking, drinking or drug use. Available admission lab work and vitals are acceptable. Patient feels a baseline physical health. She reports she began being treated for BV approximately 1 month ago and has failed treatment. She continues to follow with her OB/GYN and has taken Flagyl, advised her to follow-up with OB/GYN office. Patient appears medically stable for inpatient psychiatric treatment at this time. Please contact SLIM with any medical questions or concerns if issues should arise. Review of Systems:  Review of Systems   Psychiatric/Behavioral:  Positive for dysphoric mood. The patient is nervous/anxious. All other systems reviewed and are negative. Past Medical and Surgical History:   Medical History        Past Medical History:   Diagnosis Date    Psychiatric disorder              Surgical History   No past surgical history on file. Meds/Allergies:  PTA meds:   Prior to Admission Medications   Prescriptions Last Dose Informant Patient Reported? Taking?   lisinopril (ZESTRIL) 10 mg tablet     Yes No   Sig: Take 10 mg by mouth daily   propranolol (INDERAL LA) 60 mg 24 hr capsule     Yes No   Sig: Take 60 mg by mouth daily      Facility-Administered Medications: None         Allergies:          Allergies   Allergen Reactions    Amoxicillin Itching and Vomiting Vancomycin Itching       Scalp started itching and burning, redness in the forehead and neck    Clindamycin Itching    Beta Adrenergic Blockers Other (See Comments)         Social History:  Marital Status: /Civil Union  Substance Use History:   Social History          Substance and Sexual Activity   Alcohol Use Not Currently      Social History          Tobacco Use   Smoking Status Never   Smokeless Tobacco Never      Social History          Substance and Sexual Activity   Drug Use Not Currently         Family History:  Family History   History reviewed. No pertinent family history. Physical Exam:   Vitals:   Blood Pressure: 129/87 (09/29/23 0740)  Pulse: 83 (09/29/23 0740)  Temperature: 98.3 °F (36.8 °C) (09/29/23 0740)  Temp Source: Tympanic (09/29/23 0740)  Respirations: 18 (09/29/23 0740)  Height: 5' 1" (154.9 cm) (09/28/23 2135)  Weight - Scale: 64 kg (141 lb) (09/28/23 2135)  SpO2: 99 % (09/29/23 0740)     Physical Exam  Vitals and nursing note reviewed. Constitutional:       General: She is awake. She is not in acute distress. HENT:      Head: Normocephalic and atraumatic. Cardiovascular:      Rate and Rhythm: Normal rate and regular rhythm. Heart sounds: No murmur heard. Pulmonary:      Effort: Pulmonary effort is normal.      Breath sounds: Normal breath sounds. Abdominal:      General: There is no distension. Palpations: Abdomen is soft. Musculoskeletal:      Right lower leg: No edema. Left lower leg: No edema. Skin:     General: Skin is warm and dry. Neurological:      Mental Status: She is alert. Comments: CN II-XII grossly intact   Psychiatric:         Mood and Affect: Mood is anxious. Additional Data:   Lab Results:                     No results found for: "HGBA1C"             Imaging: No pertinent imaging reviewed. No orders to display         EKG, Pathology, and Other Studies Reviewed on Admission:   EKG: No EKG obtained.      ** Please Note: This note may have been constructed using a voice recognition system.  **

## 2023-09-29 NOTE — TREATMENT PLAN
TREATMENT PLAN REVIEW - [facility] Emani Silva 44 y.o. 1980 female MRN: [id number]    Maggie Galarza Room / Bed: [address] 209-02 Encounter: [id number]          Admit Date/Time:  9/28/2023  9:21 PM    Treatment Team: Attending Provider: Jannette Conti MD; Care Manager: Francy Cain RN; Licensed Practical Nurse: Bakari Martin LPN; Registered Nurse: Adelaida Mendosa RN; Patient Care Technician: Cristian Palmer; Patient Care Assistant: Meghana Mcnally; Registered Nurse: Elfreda Peabody, RN; Charge Nurse: Alphonse Byrd RN; : Chino Karimi; Occupational Therapy Assistant: Harjinder Grace IV    Diagnosis: Principal Problem:    Bipolar disorder ([address])  Active Problems:    Hypertension    Hypercholesteremia    BV (bacterial vaginosis)    Medical clearance for psychiatric admission    Generalized anxiety disorder with panic attacks      Patient Strengths/Assets: cooperative, motivation for treatment/growth, patient is on a voluntary commitment    Patient Barriers/Limitations: lack of stable employment, limited support system, resistance to treatment    Short Term Goals: decrease in depressive symptoms, decrease in anxiety symptoms, improvement in insight, sleep improvement, improvement in appetite, mood stabilization    Long Term Goals: improvement in depression, improvement in anxiety, resolution of depressive symptoms, resolution of manic symptoms, stabilization of mood, improved insight, acceptance of need for psychiatric medications, acceptance of need for psychiatric treatment, adequate self care, adequate sleep, adequate appetite    Progress Towards Goals: starting psychiatric medications as prescribed    Recommended Treatment: medication management, patient medication education, group therapy, milieu therapy, continued Behavioral Health psychiatric evaluation/assessment process    Treatment Frequency: daily medication monitoring, group and milieu therapy daily, monitoring through interdisciplinary rounds, monitoring through weekly patient care conferences    Expected Discharge Date:  5-7 days    Discharge Plan: referral for outpatient medication management with a psychiatrist, referral for outpatient psychotherapy    Treatment Plan Created/Updated By: Olive Saucedo MD

## 2023-09-29 NOTE — PROGRESS NOTES
[address] [facility]  Progress Note  Name: Caren Leo  MRN: [id number]  Unit/Bed#: [facility] 209-02 I Date of Admission: 9/28/2023   Date of Service: 9/29/2023 I Hospital Day: 1    Assessment/Plan   Medical clearance for psychiatric admission  Assessment & Plan  No admission labs available  CBC, UA, CMP, vitamin B12, folate, vitamin D 25-hydroxy, lipid panel, TSH labs pending  Vitals stable  UDS negative  COVID-19 negative  No EKG available  Medically stable for continued inpatient psychiatric treatment based on available results  Please contact SLIM with any questions or concerns    BV (bacterial vaginosis)  Assessment & Plan  Reports she started treatment for this 1 month ago and believes it is still present  Advised outpatient follow-up with OB/GYN adherence to Flagyl regimen  Given invasiveness of testing and patient's current mental state, will forego additional testing while admitted to inpatient behavioral health unit    Hypertension  Assessment & Plan  Maintained on lisinopril and propranolol, continue  Monitor blood pressure per unit protocol    Bipolar disorder [facility])  Assessment & Plan  Management per psychiatry           VTE Prophylaxis: VTE Score: 2 Low Risk (Score 0-2) - Encourage Ambulation. Recommendations for Discharge:  Discharge timeline per primary team.  Routine follow-up with primary care provider.  on cessation from. Total Time Spent on Date of Encounter in care of patient: 35 mins. This time was spent on one or more of the following: performing physical exam; counseling and coordination of care; obtaining or reviewing history; documenting in the medical record; reviewing/ordering tests, medications or procedures; communicating with other healthcare professionals and discussing with patient's family/caregivers. Collaboration of Care:  Were Recommendations Directly Discussed with Primary Treatment Team? No    History of Present Illness:  Armando Angelia is a 44 y.o. female who is originally admitted to the behavioral health service due to worsening anxiety and depression. We are consulted for chronic medical conditions. Patient denies any chronic medical conditions for which she takes daily medications. Patient should continue all prior to admission medications as prescribed by primary care provider/outpatient specialists. Patient denies recent travel, illness or sick contacts. Patient denies smoking, drinking or drug use. Available admission lab work and vitals are acceptable. Patient feels a baseline physical health. She reports she began being treated for BV approximately 1 month ago and has failed treatment. She continues to follow with her OB/GYN and has taken Flagyl, advised her to follow-up with OB/GYN office. Patient appears medically stable for inpatient psychiatric treatment at this time. Please contact SLIM with any medical questions or concerns if issues should arise. Review of Systems:  Review of Systems   Psychiatric/Behavioral:  Positive for dysphoric mood. The patient is nervous/anxious. All other systems reviewed and are negative. Past Medical and Surgical History:   Past Medical History:   Diagnosis Date    Psychiatric disorder        No past surgical history on file. Meds/Allergies:  PTA meds:   Prior to Admission Medications   Prescriptions Last Dose Informant Patient Reported? Taking?   lisinopril (ZESTRIL) 10 mg tablet   Yes No   Sig: Take 10 mg by mouth daily   propranolol (INDERAL LA) 60 mg 24 hr capsule   Yes No   Sig: Take 60 mg by mouth daily      Facility-Administered Medications: None       Allergies:    Allergies   Allergen Reactions    Amoxicillin Itching and Vomiting    Vancomycin Itching     Scalp started itching and burning, redness in the forehead and neck    Clindamycin Itching    Beta Adrenergic Blockers Other (See Comments)       Social History:  Marital Status: /Civil Union  Substance Use History: Social History     Substance and Sexual Activity   Alcohol Use Not Currently     Social History     Tobacco Use   Smoking Status Never   Smokeless Tobacco Never     Social History     Substance and Sexual Activity   Drug Use Not Currently       Family History:  History reviewed. No pertinent family history. Physical Exam:   Vitals:   Blood Pressure: 129/87 (09/29/23 0740)  Pulse: 83 (09/29/23 0740)  Temperature: 98.3 °F (36.8 °C) (09/29/23 0740)  Temp Source: Tympanic (09/29/23 0740)  Respirations: 18 (09/29/23 0740)  Height: 5' 1" (154.9 cm) (09/28/23 2135)  Weight - Scale: 64 kg (141 lb) (09/28/23 2135)  SpO2: 99 % (09/29/23 0740)    Physical Exam  Vitals and nursing note reviewed. Constitutional:       General: She is awake. She is not in acute distress. HENT:      Head: Normocephalic and atraumatic. Cardiovascular:      Rate and Rhythm: Normal rate and regular rhythm. Heart sounds: No murmur heard. Pulmonary:      Effort: Pulmonary effort is normal.      Breath sounds: Normal breath sounds. Abdominal:      General: There is no distension. Palpations: Abdomen is soft. Musculoskeletal:      Right lower leg: No edema. Left lower leg: No edema. Skin:     General: Skin is warm and dry. Neurological:      Mental Status: She is alert. Comments: CN II-XII grossly intact   Psychiatric:         Mood and Affect: Mood is anxious. Additional Data:   Lab Results:                    No results found for: "HGBA1C"            Imaging: No pertinent imaging reviewed. No orders to display       EKG, Pathology, and Other Studies Reviewed on Admission:   EKG: No EKG obtained. ** Please Note: This note may have been constructed using a voice recognition system.  **

## 2023-09-29 NOTE — NURSING NOTE
Pt denies SI/HI/AVH but reports ongoing chronic depression. Pt states she has a bipolar diagnosis as well and has been taking medications since she was 18 and they have been mostly ineffective. She presents with a flat affect and has been withdrawn to her room. Pt requested and received 50 mg of atarax for anxiety H: 20 Upon follow up pt reports medication was a little helpful but states she'd rather have Ativan. Pt encouraged to speak with provider about that matter when she meets with them. Pt is also requesting information about ECT.

## 2023-09-29 NOTE — NURSING NOTE
Pt has been pleasant and cooperative, isolative at times, flat affect. Pt reports ongoing depression, denies SI/HI/AVH at this time.

## 2023-09-29 NOTE — PROGRESS NOTES
Diagnosis of  Bipolar disorder reviewed. Short term goals for decrease in depressive symptoms, decrease in anxiety symptoms, improvement in insight, sleep improvement, improvement in appetite, mood stabilization discussed. All parties in agreement and treatment plan signed.     09/29/23 1356   Team Meeting   Meeting Type Tx Team Meeting   Team Members Present   Team Members Present Physician;Nurse;   Physician Team Member Dr. Flavia Holland Team Member [facility] Management Team Member Hakan   Patient/Family Present   Patient Present Yes   Patient's Family Present No

## 2023-09-29 NOTE — NURSING NOTE
Patient presents with worsening anxiety and depression. Patient reports struggling since the birth of her baby in September. Patient has a supportive  and stated she feels safe at home. Patient stated she has not been taking psych medications for the past two months. She reports they haven't been effective. Patient stated she has no active SI or HI and no A/V hallucinations She reports feeling "down". Patient oriented to unit.

## 2023-09-29 NOTE — CASE MANAGEMENT
INTAKE    Readmit score:  Yellow 19   Confirmed Address   [address] [place] [address]: Travis Hood     Resides in the home with/can return?:    Pt lives with , 3year old son Christine Bang) and 3year old step-daughter who stays with them half of the week. Confirmed Phone Number: [phone]    Commitment Status/Admitted from: [address] [place] [address] ED   Presenting C/O:             ED Note   "  Depression        Patient presents to the ED with c/o depression and anxiety for the past year and a half since the birth of her child. States she has not been taking her psych meds for the past two months, denies SI or HI       Patient is 44year old female who presents with worsening anxiety and depression. Patient reports that she has been struggling since having the baby. She has a history of anxiety and depression that has been worsening despite an inpatient hospitalization and multiple medication adjustments. Reports that her psychiatrist recommended that she go inpatient at this time. Patient denies any suicidal or homicidal ideations. She denies any auditory visual hallucinations. She states that her anxiety has made her debilitated and she feels like she cannot do anything. She states that she is not bonding with her child, but does not have any thoughts of harming the child."     Outpatient:    Pt reported she was hospitalized at [place] [facility] about 6 months ago. She reported that "they were supposed to set me up with Outpatient but didn't. But I did start their PHP but it was too much. I went a few times but got to be too overwhelming and I stopped going."     Pt interested in ECT. But also willing to try more medications first to see if that will help. Pt started Vraylar.      Pt confirmed that for the past year she has just "been overwhelmed and no will to do anything."     Pt reported she walks and watches tv now but used to spend time with friends, read, play video games but that has all been too overwhelming for her the past year. ACT/ICM/CPS/WRT/SC: N/A   PCP:    Radha Penn PA-C  [phone]   Work/Income:      Pt reported that she worked at a [employer] but "had to step away due to my depression, it just became too much."      Legal/  Probation/[place] [employer]:    Denies    Access to Firearms:    Denies   Referrals Needed: ECT? IOP? PHP? MH OP? Will discuss with pt as she improves.      Pt reported "I have not been able to get the energy/motivation to go to groups today."      Transport at Discharge:    TBD    IMM:   [place] Text JACQUELINE: N/A   Emergency Contact:     Dennis Smith (Spouse)   [phone]    ROIs obtained:       Dennis Smith (Spouse)   [phone]    Insurance:     [id number] on Request (Pt reported she purchased the plan through [employer]    Audit:        PAWSS:  BAT:  UDS: Negative

## 2023-09-29 NOTE — H&P
Psychiatric Evaluation - Behavioral Health   Scott Rodriguez 44 y.o. female MRN: [id number]  Unit/Bed#: U 209-02 Encounter: [id number]    Assessment/Plan   Active Problems:    Bipolar disorder ([address] )    Hypertension    Hypercholesteremia    BV (bacterial vaginosis)    Medical clearance for psychiatric admission    Plan:   Start Vraylar 1.5 mg daily  Pristiq 50 mg Daily  Continue Propranolol 60 mg Daily for anxiety  Admit to [address] on 201 status for safety and treatment  No 1:1 continuous observation needed at this time, as patient feels safe on the unit. Check admission labs. Get collaterals. Collaborate with family for baseline assessment and disposition planning. Case discussed with treatment team.    Treatment options and alternatives were reviewed with the patient, who concurs with the above plan. Risks, benefits, and possible side effects of medications were explained to the patient, and she verbalizes understanding.      -----------------------------------    Chief Complaint: "Roger depressed"    History of Present Illness     Per ED provider on 9/28:"Patient is 44year old female who presents with worsening anxiety and depression. Patient reports that she has been struggling since having the baby. She has a history of anxiety and depression that has been worsening despite an inpatient hospitalization and multiple medication adjustments. Reports that her psychiatrist recommended that she go inpatient at this time. Patient denies any suicidal or homicidal ideations. She denies any auditory visual hallucinations. She states that her anxiety has made her debilitated and she feels like she cannot do anything. She states that she is not bonding with her child, but does not have any thoughts of harming the child."    Per Crisis worker on 9/28:"43 y.o female presented to the ED with depression and anxiety.   Pt stated worsening depression and anxiety since the birth of her child for the past year and a half.  Pt stated she has no motivation and energy to do daily task and to take care of her child. Pt's has a supportive  who helps take care of the Child. Patient loss of employment is another stressor. Pt stated she had trauma as a child. Pt stated she hasn't been taking her psych medications for the past two months. Pt stated her medcations have not been effective since the birth of her child. Pt denies SI/HI and A/V hallucinations. Pt was inpatient for mental health x2 at [facility] where pt was diagnosed with Bipolar. Pt currently has no outpatient Psychiatrist or Therapist. Pt denies any legal or substance abuse. Pt stated she wants to go inpatient and will sign a 12 Provider is in agreement with the 201 for inpatient treatment."    This is 43 yo female with hx of Bipolar disorder admitted to inpatient unit on voluntary status for worsening of mood in the context of non compliance and resistance to treatment. Patient reports mental health issues since the age of 17. It started with depression, lasted for 6 months. She was on 2 antidepressants which resulted in manic episode. She was hospitalized few times at that time, then stabilized for 10 years on Lamictal 50 mg and Pristiq. Medications were discontinued during pregnancy a year ago and then restarted after delivery of the baby. The medications did not help and she eventually stopped them few months ago. Patient endorses depressed mood, anhedonia, lack of motivation, low energy, poor concentration and poor sleep. She also endorses anxiety and panic attacks. Denies any symptoms of gael or psychosis.    Psychiatric Review Of Systems:  Medication side effects: none  Sleep: Poor  Appetite: no change  Hygiene: able to tend to instrumental and basic ADLs  Anxiety and panic attacks: Yes  Psychotic Symptoms: denies  Depression Symptoms: Yes  Manic Symptoms: Hx of manic episodes  PTSD Symptoms: denies  Suicidal Thoughts: denies  Homicidal Thoughts: denies    Medical Review Of Systems:   Complete ROS is negative, except as noted above.    09/15/2023  09/15/2023   1  Lorazepam 1 Mg Tablet 2.00  1  [facility]  [id number]   Pen (1153)  0  2.00 LME  [place]  [place]    08/03/2023 08/03/2023   1  Lorazepam 1 Mg Tablet 3.00  3  [facility]  [id number]   Pen (1153)  0  1.00 LME  [place]    07/03/2023 07/03/2023   1  Lorazepam 1 Mg Tablet 14.00  14  [facility]  [id number]   Pen (1153)  0  1.00 LME  [place] Ins  [place]          Historical Information     Psychiatric History:   Psychiatry diagnosis:Bipolar disorder  Inpatient Hx: Yes  Suicidal Hx:Denies  Self harming behavior Hx:Denies  Violent behavior Hx:Denies  Outpatient Hx: Yes  Medications/Trials: Over the years she tried many medications, stopped them due to side effects or inefficiency. Lithium/Depakote-side effects, lamictal stopped working. Zyprexa, Abilify, Seroquel, Latuda, geodon, Zoloft, Prozac, LexaproWellbutrin Remeron, Cymbalta, Buspar Gabapentin. Patient is interested in ECT    Substance Abuse History:  Denies any drugs or alcohol use. I spent time with Clydemurali Caleb in counseling and education on risk of substance abuse. I assessed motivation and encouraged her for treatment as appropriate. Family Psychiatric History:   Patient denies any known family history of suicide attempt, or substance abuse.  few family members has depression and anxiety    Social History:  Education: GED  Learning Disabilities:Denies  Living arrangement: Lives with family  Occupational History: unemployed  Functioning Relationships: Yes  Other Pertinent History:    Hx: Denies  Legal Hx:  Denies    Traumatic History:   Denies    Past Medical History:   HTN  History of Seizures: no  History of Head injury with loss of consciousness: no    Past Medical History:   Past Medical History:   Diagnosis Date   • Psychiatric disorder         -----------------------------------  Objective    Temp:  [98.2 °F (36.8 °C)-98.4 °F (36.9 °C)] 98.3 °F (36.8 °C)  HR: [73-86] 83  Resp:  [16-18] 18  BP: (113-130)/(75-87) 129/87    Mental Status Evaluation:    Appearance:  age appropriate   Behavior:  withdrawn   Speech:  soft   Mood:  anxious and depressed   Affect:  constricted   Thought Process:  goal directed and logical   Thought Content:  normal   Perceptual Disturbances: None   Risk Potential: Suicidal Ideations none  Homicidal Ideations none  Potential for Aggression No   Sensorium:  person, place and time/date   Memory:  recent and remote memory grossly intact   Consciousness:  alert and awake    Attention: attention span appeared shorter than expected for age   Insight:  fair   Judgment: fair   Gait/Station: normal gait/station   Motor Activity: no abnormal movements       Meds/Allergies   Allergies   Allergen Reactions   • Amoxicillin Itching and Vomiting   • Vancomycin Itching     Scalp started itching and burning, redness in the forehead and neck   • Clindamycin Itching   • Beta Adrenergic Blockers Other (See Comments)     all current active meds have been reviewed    Behavioral Health Medications: all current active meds have been reviewed. Changes as above. Laboratory results:  I have personally reviewed all pertinent laboratory/tests results.   Recent Results (from the past 48 hour(s))   POCT alcohol breath test    Collection Time: 09/28/23  3:05 PM   Result Value Ref Range    EXTBreath Alcohol 0.000    Rapid drug screen, urine    Collection Time: 09/28/23  3:37 PM   Result Value Ref Range    Amph/Meth UR Negative Negative    Barbiturate Ur Negative Negative    Benzodiazepine Urine Negative Negative    Cocaine Urine Negative Negative    Methadone Urine      Opiate Urine Negative Negative    PCP Ur Negative Negative    THC Urine Negative Negative    Oxycodone Urine Negative Negative   POCT pregnancy, urine    Collection Time: 09/28/23  3:54 PM   Result Value Ref Range    EXT Preg Test, Ur Negative     Control Valid -----------------------------------    Risks / Benefits of Treatment:     Risks, benefits, and possible side effects of medications explained to patient. The patient verbalizes understanding and agreement for treatment. Counseling / Coordination of Care:     Patient's presentation on admission and proposed treatment plan were discussed with the treatment team.  Diagnosis, medication changes and treatment plan were reviewed with the patient. Recent stressors were discussed with the patient. Events leading to admission were reviewed with the patient. Importance of medication and treatment compliance was reviewed with the patient.           Inpatient Psychiatric Certification:     Certification: Based upon physical, mental and social evaluations, I certify that inpatient psychiatric services are medically necessary for this patient for a duration of 7 midnights for the treatment of Bipolar disorder ([address])

## 2023-09-30 PROCEDURE — 99232 SBSQ HOSP IP/OBS MODERATE 35: CPT

## 2023-09-30 RX ADMIN — PROPRANOLOL HYDROCHLORIDE 60 MG: 60 CAPSULE, EXTENDED RELEASE ORAL at 08:22

## 2023-09-30 RX ADMIN — DESVENLAFAXINE 50 MG: 50 TABLET, FILM COATED, EXTENDED RELEASE ORAL at 08:16

## 2023-09-30 RX ADMIN — CARIPRAZINE 1.5 MG: 1.5 CAPSULE, GELATIN COATED ORAL at 08:16

## 2023-09-30 RX ADMIN — LISINOPRIL 10 MG: 10 TABLET ORAL at 08:20

## 2023-09-30 RX ADMIN — LORAZEPAM 0.5 MG: 0.5 TABLET ORAL at 10:25

## 2023-09-30 RX ADMIN — TRAZODONE HYDROCHLORIDE 50 MG: 50 TABLET ORAL at 21:10

## 2023-09-30 NOTE — PLAN OF CARE
Problem: Ineffective Coping  Goal: Identifies healthy coping skills  Outcome: Progressing     Problem: Depression  Goal: Verbalize thoughts and feelings  Description: Interventions:  - Assess and re-assess patient's level of risk   - Engage patient in 1:1 interactions, daily, for a minimum of 15 minutes   - Encourage patient to express feelings, fears, frustrations, hopes   Outcome: Progressing  Goal: Refrain from harming self  Description: Interventions:  - Monitor patient closely, per order   - Supervise medication ingestion, monitor effects and side effects   Outcome: Progressing     Problem: Anxiety  Goal: Anxiety is at manageable level  Description: Interventions:  - Assess and monitor patient's anxiety level. - Monitor for signs and symptoms (heart palpitations, chest pain, shortness of breath, headaches, nausea, feeling jumpy, restlessness, irritable, apprehensive). - Collaborate with interdisciplinary team and initiate plan and interventions as ordered.   - [place] patient to unit/surroundings  - Explain treatment plan  - Encourage participation in care  - Encourage verbalization of concerns/fears  - Identify coping mechanisms  - Assist in developing anxiety-reducing skills  - Administer/offer alternative therapies  - Limit or eliminate stimulants  Outcome: Progressing

## 2023-09-30 NOTE — PROGRESS NOTES
Progress Note - Behavioral Health   Casandra Quiroz 44 y.o. female MRN: [id number]  Unit/Bed#: -02 Encounter: [id number]    Assessment/Plan   Principal Problem:    Bipolar disorder ([address])  Active Problems:    Hypertension    Hypercholesteremia    BV (bacterial vaginosis)    Medical clearance for psychiatric admission    Generalized anxiety disorder with panic attacks      Recommended Treatment:     No medication changes at this time  Continue Pristiq 50 mg daily for symptoms of depression and anxiety  Continue Vraylar 1.5 mg daily for mood stability  Continue propanolol 60 mg daily for anxiety    Continue with group therapy, milieu therapy and occupational therapy. Continue frequent safety checks and vitals per unit protocol. Case discussed with treatment team.  Risks, benefits and possible side effects of Medications: Risks, benefits, and possible side effects of medications have been explained to the patient, who verbalizes understanding    ------------------------------------------------------------    Subjective:     Per nursing report, on the inpatient psychiatric unit Cherelle Voss has been making slow progress. She was seen for initial psychiatric history and physical yesterday. Following initial intake assessment she was observed to be generally isolative to herself on the inpatient psychiatric unit, pleasant and cooperative in interactions, with a diminished affect. On evening nursing assessment she reported ongoing depression. On evening nursing assessment she denied SI, HI, and AVH. On the inpatient psychiatric unit Cherelle Voss has remained medication and meal compliant. Today, Cherelle Voss reports that she feels "so so." She reports her symptoms of depression and anxiety are largely unchanged since her initial psychiatric intake yesterday. She currently rates her depression as a 7/10 (with 10 being the most severe) and rates her anxiety as a 8/10 (with 8 being the most severe).  At this time Cherelle Voss reports that she has been struggling overall with her motivation to participate in activities on the unit. She is hopeful that the medications will start to work in the coming days. Support and reassurance was provided. Leah Gregg reports that she slept adequately last night with the assistance of as needed trazodone. Leah Gregg reports that her appetite has been slowly improving and that she has been eating "a little bit" more. Leah Gregg has been taking her psychiatric medications as prescribed and denies any medication side effects. Today, Leah Gregg denies suicidal ideation, homicidal ideation, visual and auditory hallucinations. Progress Toward Goals: slow improvement    Psychiatric Review of Systems:  Behavior over the last 24 hours: unchanged, continues to remain largely isolative to self at this time  Sleep: improving and difficulty falling asleep  Appetite: improving  Medication side effects: none verbalized  ROS: Complete review of systems is negative except as noted above.     Vital signs in last 24 hours:  Temp:  [99.2 °F (37.3 °C)-99.4 °F (37.4 °C)] 99.4 °F (37.4 °C)  HR:  [] 87  Resp:  [17] 17  BP: (122-135)/(79-99) 122/92    Mental Status Exam:  Appearance:  alert, good eye contact, appears stated age, casually dressed and adequate grooming and hygiene   Behavior:  calm and cooperative, withdrawn   Motor: no abnormal movements   Speech:  spontaneous, clear, soft and coherent   Mood:  "so so"   Affect:  constricted and depressed   Thought Process:  Organized, logical, goal-directed   Thought Content: no verbalized delusions or overt paranoia   Perceptual disturbances: denies current hallucinations and does not appear to be responding to internal stimuli at this time   Risk Potential: No active suicidal ideation, No active homicidal ideation   Cognition: oriented to self and situation, memory grossly intact, appears to be of average intelligence, attention span appeared shorter than expected for age and cognition not formally tested   Insight:  Fair   Judgment: Fair     Current Medications:  Current Facility-Administered Medications   Medication Dose Route Frequency Provider Last Rate   • acetaminophen  650 mg Oral Q6H PRN Etienne Escobedo MD     • acetaminophen  650 mg Oral Q4H PRN Etienne Escobedo MD     • acetaminophen  975 mg Oral Q6H PRN Etienne Escobedo MD     • aluminum-magnesium hydroxide-simethicone  30 mL Oral Q4H PRN Etienne Escobedo MD     • haloperidol lactate  2.5 mg Intramuscular Q4H PRN Max 4/day Etienne Escobedo MD      And   • LORazepam  1 mg Intramuscular Q4H PRN Max 4/day Etienne Escobedo MD      And   • benztropine  0.5 mg Intramuscular Q4H PRN Max 4/day Etienne Escobedo MD     • haloperidol lactate  5 mg Intramuscular Q4H PRN Max 4/day Etienne Escobedo MD      And   • LORazepam  2 mg Intramuscular Q4H PRN Max 4/day Etienne Escobedo MD      And   • benztropine  1 mg Intramuscular Q4H PRN Max 4/day Etienne Escobedo MD     • benztropine  1 mg Oral Q4H PRN Max 6/day Etienne Escobedo MD     • bisacodyl  10 mg Rectal Daily PRN Etienne Escobedo MD     • cariprazine  1.5 mg Oral Daily Manuel Enriquez MD     • desvenlafaxine succinate  50 mg Oral Daily Manuel Enriquez MD     • hydrOXYzine HCL  50 mg Oral Q6H PRN Max 4/day Etienne Escobedo MD      Or   • diphenhydrAMINE  50 mg Intramuscular Q6H PRN Etienne Escobedo MD     • haloperidol  1 mg Oral Q6H PRN Etienne Escobedo MD     • haloperidol  2.5 mg Oral Q4H PRN Max 4/day Etienne Escobedo MD     • haloperidol  5 mg Oral Q4H PRN Max 4/day Etienne Escobedo MD     • hydrOXYzine HCL  25 mg Oral Q6H PRN Max 4/day Etienne Escobedo MD     • lisinopril  10 mg Oral Daily Etienne Escobedo MD     • LORazepam  2 mg Intramuscular Q6H PRN Etienne Escobedo MD     • LORazepam  0.5 mg Oral Q8H PRN Manuel Enriquez MD     • polyethylene glycol  17 g Oral Daily PRN Etienne Escobedo MD     • propranolol  60 mg Oral Daily Jimenez Cantor MD     • senna-docusate sodium  1 tablet Oral Daily PRN Chan Willingham MD     • traZODone  50 mg Oral HS PRN Chan Willingham MD         Behavioral Health Medications: all current active meds have been reviewed. Changes as in plan section above. Laboratory results:  I have personally reviewed all pertinent laboratory/tests results.   Recent Results (from the past 48 hour(s))   POCT alcohol breath test    Collection Time: 09/28/23  3:05 PM   Result Value Ref Range    EXTBreath Alcohol 0.000    Rapid drug screen, urine    Collection Time: 09/28/23  3:37 PM   Result Value Ref Range    Amph/Meth UR Negative Negative    Barbiturate Ur Negative Negative    Benzodiazepine Urine Negative Negative    Cocaine Urine Negative Negative    Methadone Urine      Opiate Urine Negative Negative    PCP Ur Negative Negative    THC Urine Negative Negative    Oxycodone Urine Negative Negative   POCT pregnancy, urine    Collection Time: 09/28/23  3:54 PM   Result Value Ref Range    EXT Preg Test, Ur Negative     Control Valid    Urinalysis    Collection Time: 09/29/23  6:42 AM   Result Value Ref Range    Color, UA Yellow     Clarity, UA Slightly Cloudy     Specific Gravity, UA 1.025 1.005 - 1.030    pH, UA 5.5 4.5, 5.0, 5.5, 6.0, 6.5, 7.0, 7.5, 8.0    Leukocytes, UA Trace (A) Negative    Nitrite, UA Negative Negative    Protein, UA Negative Negative mg/dl    Glucose, UA Negative Negative mg/dl    Ketones, UA Negative Negative mg/dl    Urobilinogen, UA <2.0 <2.0 mg/dl mg/dl    Bilirubin, UA Negative Negative    Occult Blood, UA Negative Negative    RBC, UA None Seen None Seen, 2-4 /hpf    WBC, UA 4-10 (A) None Seen, 2-4, 5-60 /hpf    Epithelial Cells Occasional None Seen, Occasional /hpf    Bacteria, UA Occasional None Seen, Occasional /hpf   CBC and differential    Collection Time: 09/29/23  8:42 AM   Result Value Ref Range    WBC 5.07 4.31 - 10.16 Thousand/uL    RBC 4.82 3.81 - 5.12 Million/uL Hemoglobin 14.0 11.5 - 15.4 g/dL    Hematocrit 43.9 34.8 - 46.1 %    MCV 91 82 - 98 fL    MCH 29.0 26.8 - 34.3 pg    MCHC 31.9 31.4 - 37.4 g/dL    RDW 14.7 11.6 - 15.1 %    MPV 9.8 8.9 - 12.7 fL    Platelets 477 689 - 118 Thousands/uL    nRBC 0 /100 WBCs    Neutrophils Relative 59 43 - 75 %    Immat GRANS % 0 0 - 2 %    Lymphocytes Relative 29 14 - 44 %    Monocytes Relative 8 4 - 12 %    Eosinophils Relative 3 0 - 6 %    Basophils Relative 1 0 - 1 %    Neutrophils Absolute 3.00 1.85 - 7.62 Thousands/µL    Immature Grans Absolute 0.02 0.00 - 0.20 Thousand/uL    Lymphocytes Absolute 1.45 0.60 - 4.47 Thousands/µL    Monocytes Absolute 0.40 0.17 - 1.22 Thousand/µL    Eosinophils Absolute 0.13 0.00 - 0.61 Thousand/µL    Basophils Absolute 0.07 0.00 - 0.10 Thousands/µL   TSH, 3rd generation with Free T4 reflex    Collection Time: 09/29/23  9:16 AM   Result Value Ref Range    TSH 3RD GENERATON 1.508 0.450 - 4.500 uIU/mL   Comprehensive metabolic panel    Collection Time: 09/29/23  9:16 AM   Result Value Ref Range    Sodium 136 135 - 147 mmol/L    Potassium 4.0 3.5 - 5.3 mmol/L    Chloride 104 96 - 108 mmol/L    CO2 24 21 - 32 mmol/L    ANION GAP 8 mmol/L    BUN 15 5 - 25 mg/dL    Creatinine 0.94 0.60 - 1.30 mg/dL    Glucose 93 65 - 140 mg/dL    Glucose, Fasting 93 65 - 99 mg/dL    Calcium 9.8 8.4 - 10.2 mg/dL    AST 15 13 - 39 U/L    ALT 9 7 - 52 U/L    Alkaline Phosphatase 72 34 - 104 U/L    Total Protein 7.2 6.4 - 8.4 g/dL    Albumin 3.9 3.5 - 5.0 g/dL    Total Bilirubin 0.78 0.20 - 1.00 mg/dL    eGFR 75 ml/min/1.73sq m   Vitamin B12    Collection Time: 09/29/23  9:16 AM   Result Value Ref Range    Vitamin B-12 500 180 - 914 pg/mL   Folate    Collection Time: 09/29/23  9:16 AM   Result Value Ref Range    Folate >22.3 >5.9 ng/mL   Vitamin D 25 hydroxy    Collection Time: 09/29/23  9:16 AM   Result Value Ref Range    Vit D, 25-Hydroxy 27.3 (L) 30.0 - 100.0 ng/mL   Lipid panel    Collection Time: 09/29/23  9:16 AM Result Value Ref Range    Cholesterol 234 (H) See Comment mg/dL    Triglycerides 70 See Comment mg/dL    HDL, Direct 68 >=50 mg/dL    LDL Calculated 152 (H) 0 - 100 mg/dL    Non-HDL-Chol (CHOL-HDL) 166 mg/dl        Christiano Allan MD -2

## 2023-09-30 NOTE — PLAN OF CARE
Problem: Ineffective Coping  Goal: Identifies healthy coping skills  Outcome: Progressing     Problem: Ineffective Coping  Goal: Patient/Family verbalizes awareness of resources  Outcome: Progressing     Problem: Ineffective Coping  Goal: Patient/Family verbalizes awareness of resources  Outcome: Progressing     Problem: Ineffective Coping  Goal: Identifies healthy coping skills  Outcome: Progressing

## 2023-09-30 NOTE — NURSING NOTE
Pt requested and received Ativan 0.5 mg for anxiety H:25   When provided with PRN pt requested an increased dose (1mg) and stated she's been taking this dose at home. Upon checking PTA medications pt was previously prescribed (2) Ativan 1 mg pills by a provider. Pt educated that this is not a medication she routinely is prescribed and she will continue on the 0.5 mg dose of Ativan PRN while here and it is to be used for severe anxiety. Upon follow up pt is resting in bed with eyes closed. Respirations are even and unlabored; Pt appears calmer.

## 2023-09-30 NOTE — NURSING NOTE
Ariana John is calm upon approach, observed sitting at desk in room. Patient is withdrawn to room early this evening and exhibits a flat/depressed affect. Ariana John denies current SI/HI/AH/VH, reports mild depression and moderate anxiety are ongoing. Patient attributes mood to being off of medications for several months, states, "My previous outpatient provider 'fired' or 'discharged' me, because they wanted me to go inpatient, but I didn't want to, so I just didn't take meds for the past few months."  Ariana John keeps to herself when in the community, encouraged to attend groups on the unit. Patient remains compliant with prescribed medication and was encouraged to seek staff with any questions and/or concerns, verbalized understanding.

## 2023-10-01 PROCEDURE — 99232 SBSQ HOSP IP/OBS MODERATE 35: CPT

## 2023-10-01 RX ADMIN — HYDROXYZINE HYDROCHLORIDE 50 MG: 50 TABLET, FILM COATED ORAL at 09:02

## 2023-10-01 RX ADMIN — LISINOPRIL 10 MG: 10 TABLET ORAL at 09:00

## 2023-10-01 RX ADMIN — PROPRANOLOL HYDROCHLORIDE 60 MG: 60 CAPSULE, EXTENDED RELEASE ORAL at 09:00

## 2023-10-01 RX ADMIN — CARIPRAZINE 1.5 MG: 1.5 CAPSULE, GELATIN COATED ORAL at 09:00

## 2023-10-01 RX ADMIN — TRAZODONE HYDROCHLORIDE 50 MG: 50 TABLET ORAL at 21:06

## 2023-10-01 RX ADMIN — DESVENLAFAXINE 50 MG: 50 TABLET, FILM COATED, EXTENDED RELEASE ORAL at 09:00

## 2023-10-01 RX ADMIN — LORAZEPAM 0.5 MG: 0.5 TABLET ORAL at 16:00

## 2023-10-01 NOTE — PLAN OF CARE
Problem: Ineffective Coping  Goal: Identifies healthy coping skills  Outcome: Progressing  Goal: Participates in unit activities  Description: Interventions:  - Provide therapeutic environment   - Provide required programming   - Redirect inappropriate behaviors   Outcome: Progressing     Problem: Depression  Goal: Refrain from harming self  Description: Interventions:  - Monitor patient closely, per order   - Supervise medication ingestion, monitor effects and side effects   Outcome: Progressing

## 2023-10-01 NOTE — PROGRESS NOTES
Progress Note - Behavioral Health   Armando Galan 44 y.o. female MRN: [id number]  Unit/Bed#: -02 Encounter: [id number]    Assessment/Plan   Principal Problem:    Bipolar disorder ([address])  Active Problems:    Hypertension    Hypercholesteremia    BV (bacterial vaginosis)    Medical clearance for psychiatric admission    Generalized anxiety disorder with panic attacks      Recommended Treatment:     Increase Vraylar to 3 mg daily for mood stability and augmentation of antidepressant  Continue Pristiq 50 mg daily for symptoms of depression and anxiety  Continue propranolol 60 mg daily for anxiety    Continue with group therapy, milieu therapy and occupational therapy. Continue frequent safety checks and vitals per unit protocol. Case discussed with treatment team.  Risks, benefits and possible side effects of Medications: Risks, benefits, and possible side effects of medications have been explained to the patient, who verbalizes understanding    ------------------------------------------------------------    Subjective:     Per nursing report, on the inpatient psychiatric unit MIRTA continues to make slow progress. Per nursing report there have been no significant changes in Ivana's behaviors in the last 24 hours. Yesterday throughout the day she was observed to be generally isolative to herself and isolative to her room on the inpatient psychiatric unit, pleasant and cooperative in interactions, with diminished affect. On evening nursing assessment she denied suicidal ideation, homicidal ideation, visual and auditory hallucinations. She reported to evening nursing staff that she had been off of her medications for several months prior to coming to the hospital as she was discharged from the care of her outpatient psychiatrist. On the inpatient psychiatric unit MIRTA has remained medication and meal compliant. Per nursing reports she slept throughout the night without issue.     Today, MIRTA reports that she feels "the same" as yesterday. She reports that her symptoms of depression and anxiety are largely unchanged. She continues to struggle overall with motivation to participate in activities on the unit. Supportive therapy was provided and Saji Romero was encouraged to attend groups on the unit. Saji Romero was reminded that medication management is only 1 part of her inpatient psychiatric treatment and she was encouraged to make the effort to move out of her comfort zone. Saji Romero verbalized understanding. Royal Gagnon reports that on the inpatient psychiatric unit she has been spending her time on the phone talking to her , watching TV, and reading. Saji Romero reports that she slept well last night. She reports that her appetite has been slowly improving on the inpatient unit. Saji Romero has been taking her psychiatric medications as prescribed and denies any medication side effects. Today, Saji Romero denies suicidal ideation, homicidal ideation, visual and auditory hallucinations. She agrees to an increase in [address] to better address her ongoing symptoms of depression. Progress Toward Goals: slow improvement    Psychiatric Review of Systems:  Behavior over the last 24 hours: unchanged, continues to remain largely withdrawn and isolative to self  Sleep: improving  Appetite: improving  Medication side effects: none verbalized  ROS: Complete review of systems is negative except as noted above.     Vital signs in last 24 hours:  Temp:  [98.4 °F (36.9 °C)-98.8 °F (37.1 °C)] 98.4 °F (36.9 °C)  HR:  [69-75] 69  Resp:  [16-17] 16  BP: (111-118)/(80-81) 118/81    Mental Status Exam:  Appearance:  lert, good eye contact, appears stated age, casually dressed and adequate grooming and hygiene   Behavior:  calm and cooperative, withdrawn   Motor: no abnormal movements   Speech:  spontaneous, clear, soft and coherent   Mood:  "the same"   Affect:  constricted and depressed   Thought Process:  Organized, logical, goal-directed Thought Content: no verbalized delusions or overt paranoia, negative thoughts   Perceptual disturbances: denies current hallucinations and does not appear to be responding to internal stimuli at this time   Risk Potential: No active suicidal ideation, No active homicidal ideation   Cognition: oriented to self and situation, memory grossly intact, appears to be of average intelligence, attention span appeared shorter than expected for age and cognition not formally tested   Insight:  Fair   Judgment: Fair     Current Medications:  Current Facility-Administered Medications   Medication Dose Route Frequency Provider Last Rate   • acetaminophen  650 mg Oral Q6H PRN Kanu Garcia MD     • acetaminophen  650 mg Oral Q4H PRN Kanu Garcia MD     • acetaminophen  975 mg Oral Q6H PRN Kanu Garcia MD     • aluminum-magnesium hydroxide-simethicone  30 mL Oral Q4H PRN Kanu Garcia MD     • haloperidol lactate  2.5 mg Intramuscular Q4H PRN Max 4/day Kanu Garcia MD      And   • LORazepam  1 mg Intramuscular Q4H PRN Max 4/day Kanu Garcia MD      And   • benztropine  0.5 mg Intramuscular Q4H PRN Max 4/day Kanu Garcia MD     • haloperidol lactate  5 mg Intramuscular Q4H PRN Max 4/day Kanu Garcia MD      And   • LORazepam  2 mg Intramuscular Q4H PRN Max 4/day Kanu Garcia MD      And   • benztropine  1 mg Intramuscular Q4H PRN Max 4/day Kanu Garcia MD     • benztropine  1 mg Oral Q4H PRN Max 6/day Kanu Garcia MD     • bisacodyl  10 mg Rectal Daily PRN Kanu Garcia MD     • cariprazine  1.5 mg Oral Daily Tracy Gilbert MD     • desvenlafaxine succinate  50 mg Oral Daily Tracy Gilbert MD     • hydrOXYzine HCL  50 mg Oral Q6H PRN Max 4/day Kanu Garcia MD      Or   • diphenhydrAMINE  50 mg Intramuscular Q6H PRN Kanu Garcia MD     • haloperidol  1 mg Oral Q6H PRN Kanu Garcia MD     • haloperidol  2.5 mg Oral Q4H PRN Max 4/day Eric Chau MD     • haloperidol  5 mg Oral Q4H PRN Max 4/day Eric Chau MD     • hydrOXYzine HCL  25 mg Oral Q6H PRN Max 4/day Eric Chau MD     • lisinopril  10 mg Oral Daily Eric Chau MD     • LORazepam  2 mg Intramuscular Q6H PRN Eric Chau MD     • LORazepam  0.5 mg Oral Q8H PRN Adore Caba MD     • polyethylene glycol  17 g Oral Daily PRN Eric Chau MD     • propranolol  60 mg Oral Daily Adore Caba MD     • senna-docusate sodium  1 tablet Oral Daily PRN Eric Chau MD     • traZODone  50 mg Oral HS PRN Eric Chau MD         Behavioral Health Medications: all current active meds have been reviewed. Changes as in plan section above. Laboratory results:  I have personally reviewed all pertinent laboratory/tests results. No results found for this or any previous visit (from the past 48 hour(s)).      Jamshid Lin MD

## 2023-10-01 NOTE — NURSING NOTE
Pt requested and received Atarax 50 mg for anxiety. H:23 Upon follow up pt is in her bed and appears to be sleeping. Respirations and even and unlabored. During assessment pt denies SI/HI/AVh but continue with anxiety and depression. Pt reports feeling "about the same" from last interaction. Pt continues with poor appetite and isolation. Pt is compliant with all medications and utilizes PRN medications appropriately.

## 2023-10-01 NOTE — NURSING NOTE
Pt in bed and appeared to be sleeping, with even non-labored respirations and easily aroused throughout night, as observed during continuous rounding.

## 2023-10-01 NOTE — NURSING NOTE
Patient requested and received Ativan 0.5 mg PO for anxiety at 1600. Upon follow up patient reported a decrease in anxiety. PRN was effective.

## 2023-10-01 NOTE — NURSING NOTE
Ronal Aaron is calm upon approach, exhibits a flat affect and is observed sitting in her room and reading. Patient remains withdrawn to her room, despite encouragement from staff to join peers in community to socialize and attend group. Ronal Aaron is compliant with prescribed medications, denies current SI/HI/AH/VH, however, endorses continued anxiety. Patient reports feeling roughly unchanged since time of admission. Ronal Aaron was encouraged to seek staff with any questions and/or concerns, verbalized understanding.

## 2023-10-02 PROCEDURE — 99232 SBSQ HOSP IP/OBS MODERATE 35: CPT | Performed by: STUDENT IN AN ORGANIZED HEALTH CARE EDUCATION/TRAINING PROGRAM

## 2023-10-02 RX ADMIN — LORAZEPAM 0.5 MG: 0.5 TABLET ORAL at 13:01

## 2023-10-02 RX ADMIN — DESVENLAFAXINE 50 MG: 50 TABLET, FILM COATED, EXTENDED RELEASE ORAL at 08:20

## 2023-10-02 RX ADMIN — ACETAMINOPHEN 650 MG: 325 TABLET, FILM COATED ORAL at 08:55

## 2023-10-02 RX ADMIN — PROPRANOLOL HYDROCHLORIDE 60 MG: 60 CAPSULE, EXTENDED RELEASE ORAL at 08:24

## 2023-10-02 RX ADMIN — TRAZODONE HYDROCHLORIDE 50 MG: 50 TABLET ORAL at 20:12

## 2023-10-02 RX ADMIN — HYDROXYZINE HYDROCHLORIDE 50 MG: 50 TABLET, FILM COATED ORAL at 08:55

## 2023-10-02 RX ADMIN — LISINOPRIL 10 MG: 10 TABLET ORAL at 08:20

## 2023-10-02 RX ADMIN — CARIPRAZINE 3 MG: 3 CAPSULE, GELATIN COATED ORAL at 08:20

## 2023-10-02 NOTE — NURSING NOTE
F/U to requested atarax 50mg. Pt reports atarax effective, anxiety decreased. She remains withdrawn to room. Denies SI/HI and hallucinations. Encouraged to attend group and speak to staff with any questions or concerns.

## 2023-10-02 NOTE — NURSING NOTE
Patient appears to have slept the night. Observed to be resting in bed with eyes closed, respirations even and unlabored. No s/s of distress. Continuous rounding ongoing for patient safety.

## 2023-10-02 NOTE — PROGRESS NOTES
Pt withdrawn to self and room. Did not attend groups. Reported not feeling any change or improvement. Took Ativan and Atarax for anxiety. DC: TBD      10/02/23 0756   Team Meeting   Meeting Type Daily Rounds   Team Members Present   Team Members Present Physician;Nurse;; Other (Discipline and Name)   Physician Team Member Dr. Martha Stratton / Dr. Brenda Lomas / Jose R Crouch / Tony Rothman Team Member Riri Dimas / [facility] Management Team Member Joo Wesley / Bill Barrera / Racheal Gusman / Deondre Barbosa   Other (Discipline and Name) Julesund - Group Facilitator / Pamela Timmons - Art Therapy   Patient/Family Present   Patient Present No   Patient's Family Present No

## 2023-10-02 NOTE — NURSING NOTE
F/U to ativan 0.5mg PO per patient request. Destiney Chris reported increased anxiety. On reassessment she reports it was effective.

## 2023-10-02 NOTE — NURSING NOTE
Patient requested and received trazodone 50 mg po for sleep at 2106. Upon follow up patient is sleeping. PRN was effective.

## 2023-10-02 NOTE — PROGRESS NOTES
Progress Note - Behavioral Health     [facility] 44 y.o. female MRN: [id number]   Unit/Bed#: U 209-02 Encounter: [id number]    Behavior over the last 24 hours: unchanged. Kasia Crawley is a 81-year-old female with a history of bipolar disorder presenting for psychiatric follow-up. Today she reports that her medications have helped "a little bit" but she is still depressed. She reports she has trouble sleeping and poor appetite. She reports trazodone is not helping but is not interested in another medication for sleep at this time. Per nursing, she has remained withdrawn to her room and has not attended inpatient programming. Denies any medication side effects. She denies SI, HI, AH, or VH.      Sleep: improved  Appetite: poor  Medication side effects: No   ROS: all other systems are negative    Mental Status Evaluation:    Appearance:  age appropriate, casually dressed, adequate grooming   Behavior:  cooperative, calm   Speech:  normal rate, normal pitch, soft   Mood:  "depressed"   Affect:  constricted   Thought Process:  organized, logical, coherent   Associations: intact associations   Thought Content:  normal   Perceptual Disturbances: none   Risk Potential: Suicidal ideation - None  Homicidal ideation - None  Potential for aggression - No   Sensorium:  oriented to person, place and time/date   Memory:  recent and remote memory grossly intact   Consciousness:  alert and awake   Attention/Concentration: attention span and concentration appear shorter than expected for age   Insight:  fair   Judgment: fair   Gait/Station: normal gait/station   Motor Activity: no abnormal movements     Vital signs in last 24 hours:    Temp:  [99.8 °F (37.7 °C)-100.9 °F (38.3 °C)] 99.8 °F (37.7 °C)  HR:  [84-86] 86  Resp:  [18] 18  BP: (116-125)/(81-83) 125/83    Laboratory results: I have personally reviewed all pertinent laboratory/tests results    Results from the past 24 hours: No results found for this or any previous visit (from the past 24 hour(s)). Most Recent Labs:   Lab Results   Component Value Date    WBC 5.07 09/29/2023    RBC 4.82 09/29/2023    HGB 14.0 09/29/2023    HCT 43.9 09/29/2023     09/29/2023    RDW 14.7 09/29/2023    NEUTROABS 3.00 09/29/2023    SODIUM 136 09/29/2023    K 4.0 09/29/2023     09/29/2023    CO2 24 09/29/2023    BUN 15 09/29/2023    CREATININE 0.94 09/29/2023    GLUC 93 09/29/2023    CALCIUM 9.8 09/29/2023    AST 15 09/29/2023    ALT 9 09/29/2023    ALKPHOS 72 09/29/2023    TP 7.2 09/29/2023    ALB 3.9 09/29/2023    TBILI 0.78 09/29/2023    CHOLESTEROL 234 (H) 09/29/2023    HDL 68 09/29/2023    TRIG 70 09/29/2023    LDLCALC 152 (H) 09/29/2023    [address] [facility] 166 09/29/2023    [id number] 1.508 09/29/2023       Progress Toward Goals: progressing    Assessment/Plan   Principal Problem:    Bipolar disorder ([address])  Active Problems:    Hypertension    Hypercholesteremia    BV (bacterial vaginosis)    Medical clearance for psychiatric admission    Generalized anxiety disorder with panic attacks      Recommended Treatment:     Planned medication and treatment changes: All current active medications have been reviewed  Encourage group therapy, milieu therapy and occupational therapy  Behavioral Health checks every 7 minutes     Vraylar was increased to 3 mg this morning. Remains depressed, but not suicidal.  Continue current medications.     Current Facility-Administered Medications   Medication Dose Route Frequency Provider Last Rate   • acetaminophen  650 mg Oral Q6H PRN Kimberly Loving MD     • acetaminophen  650 mg Oral Q4H PRN Kimberly Loving MD     • acetaminophen  975 mg Oral Q6H PRN Kimberly Loving MD     • aluminum-magnesium hydroxide-simethicone  30 mL Oral Q4H PRN Kimberly Loving MD     • haloperidol lactate  2.5 mg Intramuscular Q4H PRN Max 4/day Kimberly Loving MD      And   • LORazepam  1 mg Intramuscular Q4H PRN Max 4/day Kimberly Loving MD      And   • benztropine  0.5 mg Intramuscular Q4H PRN Max 4/day Etienne Escobedo MD     • haloperidol lactate  5 mg Intramuscular Q4H PRN Max 4/day Etienne Escobedo MD      And   • LORazepam  2 mg Intramuscular Q4H PRN Max 4/day Etienne Escobedo MD      And   • benztropine  1 mg Intramuscular Q4H PRN Max 4/day Etienne Escobedo MD     • benztropine  1 mg Oral Q4H PRN Max 6/day Etienne Escobedo MD     • bisacodyl  10 mg Rectal Daily PRN Etienne Escobedo MD     • cariprazine  3 mg Oral Daily Lamont Marsh MD     • desvenlafaxine succinate  50 mg Oral Daily Manuel Enriquez MD     • hydrOXYzine HCL  50 mg Oral Q6H PRN Max 4/day Etienne Escobedo MD      Or   • diphenhydrAMINE  50 mg Intramuscular Q6H PRN Etienne Escobedo MD     • haloperidol  1 mg Oral Q6H PRN Etienne Escobedo MD     • haloperidol  2.5 mg Oral Q4H PRN Max 4/day Etienne Escobedo MD     • haloperidol  5 mg Oral Q4H PRN Max 4/day Etienne Escobedo MD     • hydrOXYzine HCL  25 mg Oral Q6H PRN Max 4/day Etienne Escobedo MD     • lisinopril  10 mg Oral Daily Etienne Escobedo MD     • LORazepam  2 mg Intramuscular Q6H PRN Etienne Escobedo MD     • LORazepam  0.5 mg Oral Q8H PRN Manuel Enriquez MD     • polyethylene glycol  17 g Oral Daily PRN Etienne Escobedo MD     • propranolol  60 mg Oral Daily Manuel Enriquez MD     • senna-docusate sodium  1 tablet Oral Daily PRN Etienne Escobedo MD     • traZODone  50 mg Oral HS PRN Etienne Escobedo MD       Risks / Benefits of Treatment:    Risks, benefits, and possible side effects of medications explained to patient and patient verbalizes understanding and agreement for treatment. Counseling / Coordination of Care:    Patient's progress discussed with staff in treatment team meeting. Medications, treatment progress and treatment plan reviewed with patient.     Karl Yee PA-C 10/02/23

## 2023-10-03 PROCEDURE — 99232 SBSQ HOSP IP/OBS MODERATE 35: CPT | Performed by: STUDENT IN AN ORGANIZED HEALTH CARE EDUCATION/TRAINING PROGRAM

## 2023-10-03 RX ORDER — LORAZEPAM 1 MG/1
1 TABLET ORAL EVERY 8 HOURS PRN
Status: DISCONTINUED | OUTPATIENT
Start: 2023-10-03 | End: 2023-10-18 | Stop reason: HOSPADM

## 2023-10-03 RX ORDER — HYDROXYZINE 50 MG/1
50 TABLET, FILM COATED ORAL EVERY 6 HOURS PRN
Status: DISCONTINUED | OUTPATIENT
Start: 2023-10-03 | End: 2023-10-18 | Stop reason: HOSPADM

## 2023-10-03 RX ORDER — LORAZEPAM 0.5 MG/1
0.5 TABLET ORAL ONCE
Status: COMPLETED | OUTPATIENT
Start: 2023-10-03 | End: 2023-10-03

## 2023-10-03 RX ORDER — LORAZEPAM 2 MG/ML
1 INJECTION INTRAMUSCULAR EVERY 8 HOURS PRN
Status: DISCONTINUED | OUTPATIENT
Start: 2023-10-03 | End: 2023-10-18 | Stop reason: HOSPADM

## 2023-10-03 RX ADMIN — PROPRANOLOL HYDROCHLORIDE 60 MG: 60 CAPSULE, EXTENDED RELEASE ORAL at 08:22

## 2023-10-03 RX ADMIN — LISINOPRIL 10 MG: 10 TABLET ORAL at 08:22

## 2023-10-03 RX ADMIN — DESVENLAFAXINE 50 MG: 50 TABLET, FILM COATED, EXTENDED RELEASE ORAL at 08:22

## 2023-10-03 RX ADMIN — TRAZODONE HYDROCHLORIDE 50 MG: 50 TABLET ORAL at 21:17

## 2023-10-03 RX ADMIN — CARIPRAZINE 3 MG: 3 CAPSULE, GELATIN COATED ORAL at 08:22

## 2023-10-03 RX ADMIN — LORAZEPAM 0.5 MG: 0.5 TABLET ORAL at 10:20

## 2023-10-03 RX ADMIN — LORAZEPAM 0.5 MG: 0.5 TABLET ORAL at 09:53

## 2023-10-03 RX ADMIN — HYDROXYZINE HYDROCHLORIDE 50 MG: 50 TABLET, FILM COATED ORAL at 08:23

## 2023-10-03 NOTE — NURSING NOTE
Patient calm and pleasant during interaction. Patient stays in room most of the time reading and resting. Patient states feeling "Slightly better" Patient states having anxiety and depression at this time. Denies SI/HI/AVH. Patient is hopeful the medication she is on will start to work better for her. Staff encouraged patient to approach with any questions or concerns.

## 2023-10-03 NOTE — PROGRESS NOTES
Progress Note - Behavioral Health Carlos Gist 44 y.o. female MRN: [id number]   Unit/Bed#: -02 Encounter: [id number]    Behavior over the last 24 hours: slowly improving. Jessica Ellis is a 52-year-old female with a history of bipolar disorder presenting for psychiatric follow-up. Today she reports that she is still feeling depressed and amotivated. She continues to be withdrawn but states that she attended group last night. She reports increased anxiety for which she is receiving Atarax and Ativan PRNs. She is requesting to increase the Ativan as needed dosage. She believes the Pristiq may be contributing to her anxiety and making her more "restless". She denies any other medication side effects. Denies SI, HI, or hallucinations. Sleep: improved  Appetite: normal  Medication side effects: Yes - restlessness   ROS: all other systems are negative    Mental Status Evaluation:    Appearance:  age appropriate, casually dressed, adequate grooming   Behavior:  cooperative, calm   Speech:  normal rate, normal pitch, soft   Mood:  "still depressed" anxious   Affect:  constricted   Thought Process:  organized, logical, coherent   Associations: intact associations   Thought Content:  normal   Perceptual Disturbances: none   Risk Potential: Suicidal ideation - None, but had suicidal ideation prior to admission.   Homicidal ideation - None  Potential for aggression - No   Sensorium:  oriented to person, place and time/date   Memory:  recent and remote memory grossly intact   Consciousness:  alert and awake   Attention/Concentration: attention span and concentration appear shorter than expected for age   Insight:  improving   Judgment: improving   Gait/Station: normal gait/station, in bed   Motor Activity: no abnormal movements     Vital signs in last 24 hours:    Temp:  [98.6 °F (37 °C)-100.2 °F (37.9 °C)] 100.2 °F (37.9 °C)  HR:  [70-71] 70  Resp:  [16-18] 18  BP: (118-121)/(72-85) 121/85    Laboratory results: I have personally reviewed all pertinent laboratory/tests results    Results from the past 24 hours: No results found for this or any previous visit (from the past 24 hour(s)). Most Recent Labs:   Lab Results   Component Value Date    WBC 5.07 09/29/2023    RBC 4.82 09/29/2023    HGB 14.0 09/29/2023    HCT 43.9 09/29/2023     09/29/2023    RDW 14.7 09/29/2023    NEUTROABS 3.00 09/29/2023    SODIUM 136 09/29/2023    K 4.0 09/29/2023     09/29/2023    CO2 24 09/29/2023    BUN 15 09/29/2023    CREATININE 0.94 09/29/2023    GLUC 93 09/29/2023    CALCIUM 9.8 09/29/2023    AST 15 09/29/2023    ALT 9 09/29/2023    ALKPHOS 72 09/29/2023    TP 7.2 09/29/2023    ALB 3.9 09/29/2023    TBILI 0.78 09/29/2023    CHOLESTEROL 234 (H) 09/29/2023    HDL 68 09/29/2023    TRIG 70 09/29/2023    LDLCALC 152 (H) 09/29/2023    [address] 166 09/29/2023    [id number] 1.508 09/29/2023       Progress Toward Goals: progressing    Assessment/Plan   Principal Problem:    Bipolar disorder ([address])  Active Problems:    Hypertension    Hypercholesteremia    BV (bacterial vaginosis)    Medical clearance for psychiatric admission    Generalized anxiety disorder with panic attacks      Recommended Treatment:     Planned medication and treatment changes: All current active medications have been reviewed  Encourage group therapy, milieu therapy and occupational therapy  Behavioral Health checks every 7 minutes     Increased Ativan PRN to 1 mg for anxiety. Discussed maintenance options for anxiety in the future including Buspar or gabapentin, patient will consider. Continue current medications.      Current Facility-Administered Medications   Medication Dose Route Frequency Provider Last Rate   • acetaminophen  650 mg Oral Q6H PRN Yaz Melton MD     • acetaminophen  650 mg Oral Q4H PRN Yaz Melton MD     • acetaminophen  975 mg Oral Q6H PRN Yaz MD Geronimo     • aluminum-magnesium hydroxide-simethicone  30 mL Oral Q4H PRN Vane Baum MD     • haloperidol lactate  2.5 mg Intramuscular Q4H PRN Max 4/day Vane Baum MD      And   • LORazepam  1 mg Intramuscular Q4H PRN Max 4/day Vane Baum MD      And   • benztropine  0.5 mg Intramuscular Q4H PRN Max 4/day Vane Baum MD     • haloperidol lactate  5 mg Intramuscular Q4H PRN Max 4/day Vane Baum MD      And   • LORazepam  2 mg Intramuscular Q4H PRN Max 4/day Vane Baum MD      And   • benztropine  1 mg Intramuscular Q4H PRN Max 4/day Vane Baum MD     • benztropine  1 mg Oral Q4H PRN Max 6/day Vane Baum MD     • bisacodyl  10 mg Rectal Daily PRN Vane Baum MD     • cariprazine  3 mg Oral Daily William Nava MD     • desvenlafaxine succinate  50 mg Oral Daily Savita Roman MD     • haloperidol  1 mg Oral Q6H PRN Vane Baum MD     • haloperidol  2.5 mg Oral Q4H PRN Max 4/day Vane Baum MD     • haloperidol  5 mg Oral Q4H PRN Max 4/day Vane Baum MD     • hydrOXYzine HCL  50 mg Oral Q6H PRN Lili Nielson PA-C     • lisinopril  10 mg Oral Daily Vane Baum MD     • LORazepam  1 mg Intramuscular Q8H PRN Lili Nielson PA-C     • LORazepam  1 mg Oral Q8H PRN Lili Nielson PA-C     • polyethylene glycol  17 g Oral Daily PRN Vane Baum MD     • propranolol  60 mg Oral Daily Savita Roman MD     • senna-docusate sodium  1 tablet Oral Daily PRN Vane Baum MD     • traZODone  50 mg Oral HS PRN Vane Baum MD       Risks / Benefits of Treatment:    Risks, benefits, and possible side effects of medications explained to patient and patient verbalizes understanding and agreement for treatment. Counseling / Coordination of Care:    Patient's progress discussed with staff in treatment team meeting. Medications, treatment progress and treatment plan reviewed with patient.     Lili Nielson PA-C 10/03/23

## 2023-10-03 NOTE — PROGRESS NOTES
Pt withdrawn to room and self. Does not attend groups. Continues to report anxiety. Given Atarax and Ativan. DC: TBD      10/03/23 0753   Team Meeting   Meeting Type Daily Rounds   Team Members Present   Team Members Present Physician;Nurse;; Other (Discipline and Name)   Physician Team Member Dr. Dee Recio / Dr. Serafin Hammond / Jyoti Gordon Team Member Martha Motley / Nguyen Cantor Management Team Member Maribell Dyer / Ora Brandt / Jody Tse / Christina Galvin   Other (Discipline and Name) Cj Mena - Art Therapy / Pharmacist   Patient/Family Present   Patient Present No   Patient's Family Present No

## 2023-10-03 NOTE — NURSING NOTE
Patient requested and received PRN Trazodone 50 mg po at 2012 for insomnia. Upon f/u in 1 hr, pt is resting in bed, appears to be asleep. No s/s of distress noted.

## 2023-10-03 NOTE — NURSING NOTE
F/U atarax 50mg PO. Patient requested and received PRN for anxiety. On reassessment the patient denies any relief. Ativan 0.5mg given. MIRTA describes feeling more physical energy but denies any improvement with anxiety or depression. Denies SI/HI and hallucinations.  States, "I would just like to relax enough to get sleep."

## 2023-10-03 NOTE — PLAN OF CARE
Problem: Ineffective Coping  Goal: Identifies ineffective coping skills  Outcome: Progressing  Goal: Identifies healthy coping skills  Outcome: Progressing  Goal: Demonstrates healthy coping skills  Outcome: Progressing  Goal: Participates in unit activities  Description: Interventions:  - Provide therapeutic environment   - Provide required programming   - Redirect inappropriate behaviors   Outcome: Progressing  Goal: Patient/Family participate in treatment and DC plans  Description: Interventions:  - Provide therapeutic environment  Outcome: Progressing  Goal: Patient/Family verbalizes awareness of resources  Outcome: Progressing  Goal: Understands least restrictive measures  Description: Interventions:  - Utilize least restrictive behavior  Outcome: Progressing  Goal: Free from restraint events  Description: - Utilize least restrictive measures   - Provide behavioral interventions   - Redirect inappropriate behaviors   Outcome: Progressing     Problem: Depression  Goal: Verbalize thoughts and feelings  Description: Interventions:  - Assess and re-assess patient's level of risk   - Engage patient in 1:1 interactions, daily, for a minimum of 15 minutes   - Encourage patient to express feelings, fears, frustrations, hopes   Outcome: Progressing  Goal: Refrain from harming self  Description: Interventions:  - Monitor patient closely, per order   - Supervise medication ingestion, monitor effects and side effects   Outcome: Progressing  Goal: Refrain from self-neglect  Outcome: Progressing  Goal: Complete daily ADLs, including personal hygiene independently, as able  Description: Interventions:  - Observe, teach, and assist patient with ADLS  -  Monitor and promote a balance of rest/activity, with adequate nutrition and elimination   Outcome: Progressing

## 2023-10-03 NOTE — NURSING NOTE
Patients withdrawn to her room, resting in bed, states she's tired. Encouraged patient to be active in the milieu and attend groups. She said she usually does but she's tired today. Patient reports fair appetite and is sleeping well at night. She reports anxiety has decreased since she last took medication for anxiety.

## 2023-10-03 NOTE — PLAN OF CARE
Problem: Ineffective Coping  Goal: Identifies ineffective coping skills  Outcome: Progressing  Goal: Identifies healthy coping skills  Outcome: Progressing  Goal: Demonstrates healthy coping skills  Outcome: Progressing  Goal: Participates in unit activities  Description: Interventions:  - Provide therapeutic environment   - Provide required programming   - Redirect inappropriate behaviors   Outcome: Progressing  Goal: Patient/Family participate in treatment and DC plans  Description: Interventions:  - Provide therapeutic environment  Outcome: Progressing  Goal: Patient/Family verbalizes awareness of resources  Outcome: Progressing  Goal: Understands least restrictive measures  Description: Interventions:  - Utilize least restrictive behavior  Outcome: Progressing  Goal: Free from restraint events  Description: - Utilize least restrictive measures   - Provide behavioral interventions   - Redirect inappropriate behaviors   Outcome: Progressing     Problem: Depression  Goal: Treatment Goal: Demonstrate behavioral control of depressive symptoms, verbalize feelings of improved mood/affect, and adopt new coping skills prior to discharge  Outcome: Progressing  Goal: Verbalize thoughts and feelings  Description: Interventions:  - Assess and re-assess patient's level of risk   - Engage patient in 1:1 interactions, daily, for a minimum of 15 minutes   - Encourage patient to express feelings, fears, frustrations, hopes   Outcome: Progressing  Goal: Refrain from harming self  Description: Interventions:  - Monitor patient closely, per order   - Supervise medication ingestion, monitor effects and side effects   Outcome: Progressing  Goal: Refrain from isolation  Description: Interventions:  - Develop a trusting relationship   - Encourage socialization   Outcome: Progressing  Goal: Refrain from self-neglect  Outcome: Progressing  Goal: Attend and participate in unit activities, including therapeutic, recreational, and educational groups  Description: Interventions:  - Provide therapeutic and educational activities daily, encourage attendance and participation, and document same in the medical record   Outcome: Progressing  Goal: Complete daily ADLs, including personal hygiene independently, as able  Description: Interventions:  - Observe, teach, and assist patient with ADLS  -  Monitor and promote a balance of rest/activity, with adequate nutrition and elimination   Outcome: Progressing     Problem: Anxiety  Goal: Anxiety is at manageable level  Description: Interventions:  - Assess and monitor patient's anxiety level. - Monitor for signs and symptoms (heart palpitations, chest pain, shortness of breath, headaches, nausea, feeling jumpy, restlessness, irritable, apprehensive). - Collaborate with interdisciplinary team and initiate plan and interventions as ordered.   - [place] patient to unit/surroundings  - Explain treatment plan  - Encourage participation in care  - Encourage verbalization of concerns/fears  - Identify coping mechanisms  - Assist in developing anxiety-reducing skills  - Administer/offer alternative therapies  - Limit or eliminate stimulants  Outcome: Progressing     Problem: DISCHARGE PLANNING  Goal: Discharge to home or other facility with appropriate resources  Description: INTERVENTIONS:  - Identify barriers to discharge w/patient and caregiver  - Arrange for needed discharge resources and transportation as appropriate  - Identify discharge learning needs (meds, wound care, etc.)  - Arrange for interpretive services to assist at discharge as needed  - Refer to Case Management Department for coordinating discharge planning if the patient needs post-hospital services based on physician/advanced practitioner order or complex needs related to functional status, cognitive ability, or social support system  Outcome: Progressing

## 2023-10-04 PROCEDURE — 99232 SBSQ HOSP IP/OBS MODERATE 35: CPT | Performed by: STUDENT IN AN ORGANIZED HEALTH CARE EDUCATION/TRAINING PROGRAM

## 2023-10-04 RX ADMIN — LORAZEPAM 1 MG: 1 TABLET ORAL at 19:28

## 2023-10-04 RX ADMIN — PROPRANOLOL HYDROCHLORIDE 60 MG: 60 CAPSULE, EXTENDED RELEASE ORAL at 08:42

## 2023-10-04 RX ADMIN — TRAZODONE HYDROCHLORIDE 50 MG: 50 TABLET ORAL at 20:58

## 2023-10-04 RX ADMIN — DESVENLAFAXINE 50 MG: 50 TABLET, FILM COATED, EXTENDED RELEASE ORAL at 08:42

## 2023-10-04 RX ADMIN — LISINOPRIL 10 MG: 10 TABLET ORAL at 08:42

## 2023-10-04 RX ADMIN — LORAZEPAM 1 MG: 1 TABLET ORAL at 08:42

## 2023-10-04 RX ADMIN — CARIPRAZINE 3 MG: 3 CAPSULE, GELATIN COATED ORAL at 08:42

## 2023-10-04 NOTE — PLAN OF CARE
Problem: Ineffective Coping  Goal: Identifies ineffective coping skills  Outcome: Progressing  Goal: Identifies healthy coping skills  Outcome: Progressing     Problem: Depression  Goal: Verbalize thoughts and feelings  Description: Interventions:  - Assess and re-assess patient's level of risk   - Engage patient in 1:1 interactions, daily, for a minimum of 15 minutes   - Encourage patient to express feelings, fears, frustrations, hopes   Outcome: Progressing  Goal: Refrain from harming self  Description: Interventions:  - Monitor patient closely, per order   - Supervise medication ingestion, monitor effects and side effects   Outcome: Progressing     Problem: Depression  Goal: Refrain from isolation  Description: Interventions:  - Develop a trusting relationship   - Encourage socialization   Outcome: Not Progressing     Problem: Anxiety  Goal: Anxiety is at manageable level  Description: Interventions:  - Assess and monitor patient's anxiety level. - Monitor for signs and symptoms (heart palpitations, chest pain, shortness of breath, headaches, nausea, feeling jumpy, restlessness, irritable, apprehensive). - Collaborate with interdisciplinary team and initiate plan and interventions as ordered.   - [place] patient to unit/surroundings  - Explain treatment plan  - Encourage participation in care  - Encourage verbalization of concerns/fears  - Identify coping mechanisms  - Assist in developing anxiety-reducing skills  - Administer/offer alternative therapies  - Limit or eliminate stimulants  Outcome: Not Progressing

## 2023-10-04 NOTE — NURSING NOTE
Pt denies SI/HI/AVH currently. Pt endorses ongoing depression and anxiety. She presents with a flat affect and is delayed in her answers. When asked if she is feeling any better since arriving to unit with medication adjustments pt states "maybe slightly improved". Pt requested and received Ativan 1 mg for anxiety. When asked pt states her anxiety is related to being inpatient. Pt is withdrawn to room and refused breakfast. Pt is noted to frequently skip meals and endorses a poor appetite.

## 2023-10-04 NOTE — PROGRESS NOTES
Progress Note - Behavioral Health     Iveth Mcdaniel 44 y.o. female MRN: [id number]   Unit/Bed#: [facility] 209-02 Encounter: [id number]    Behavior over the last 24 hours: unchanged. Alaina Rivas is a 56-year-old female with a history of bipolar depression who presents for psychiatric follow-up. Staff reports the patient has been mostly isolative to her room. Upon approach, she appears withdrawn, guarded, psychomotor retarded and depressed appearing. There was a slight delay with her responses and she speaks in soft tones. She continues to feel depressed and anxious. She has not participated much in inpatient programming. She feels amotivated and anergic and her appetite has been poor. She has been requesting Ativan as a as needed on a daily basis for anxiety. She noted some "jitteriness" since beginning her current regimen of Pristiq and Yanique Sleek, but feels this is beginning to improve. No SI or HI. No AH or VH. Sleep: normal  Appetite: decreased  Medication side effects: Yes - "jitteriness"   ROS: all other systems are negative    Mental Status Evaluation:    Appearance: casually dressed, consistent with stated age  Motor: +psychomotor retardation, no gait abnormalities  Behavior: cooperative, answers questions appropriately  Speech: soft, normal rhythm  Mood: depressed, anxious  Affect: constricted, depressed-appearing  Thought Process: linear and goal-oriented  Thought Content: denies auditory hallucinations, denies visual hallucinations, denies delusions  Risk Potential: denies suicidal ideation, plan, or intent.  Denies homicidal ideation  Sensorium: Oriented to person, place, time, and situation  Cognition: cognitive ability appears intact but was not quantitatively tested  Consciousness: alert and awake  Attention: intact, able to focus without difficulty  Insight: fair  Judgement: limited    Vital signs in last 24 hours:    Temp:  [98.8 °F (37.1 °C)-100.5 °F (38.1 °C)] 98.8 °F (37.1 °C)  HR:  Ahsan Rivera 70  Resp:  [16-17] 17  BP: (106-115)/(76-77) 115/76    Laboratory results: I have personally reviewed all pertinent laboratory/tests results    Results from the past 24 hours: No results found for this or any previous visit (from the past 24 hour(s)). Most Recent Labs:   Lab Results   Component Value Date    WBC 5.07 09/29/2023    RBC 4.82 09/29/2023    HGB 14.0 09/29/2023    HCT 43.9 09/29/2023     09/29/2023    RDW 14.7 09/29/2023    NEUTROABS 3.00 09/29/2023    SODIUM 136 09/29/2023    K 4.0 09/29/2023     09/29/2023    CO2 24 09/29/2023    BUN 15 09/29/2023    CREATININE 0.94 09/29/2023    GLUC 93 09/29/2023    CALCIUM 9.8 09/29/2023    AST 15 09/29/2023    ALT 9 09/29/2023    ALKPHOS 72 09/29/2023    TP 7.2 09/29/2023    ALB 3.9 09/29/2023    TBILI 0.78 09/29/2023    CHOLESTEROL 234 (H) 09/29/2023    HDL 68 09/29/2023    TRIG 70 09/29/2023    LDLCALC 152 (H) 09/29/2023    [facility] 166 09/29/2023    [id number] 1.508 09/29/2023       Progress Toward Goals: no changes in past 24 hours    Assessment/Plan   Principal Problem:    Bipolar disorder ([address])  Active Problems:    Hypertension    Hypercholesteremia    BV (bacterial vaginosis)    Medical clearance for psychiatric admission    Generalized anxiety disorder with panic attacks      Recommended Treatment:     Planned medication and treatment changes: All current active medications have been reviewed  Encourage group therapy, milieu therapy and occupational therapy  Behavioral Health checks every 7 minutes    Continue current medications. Can consider advancing Pristiq to 100mg in the coming days, though patient has already experienced some "jiterriness" on the 50mg dose and this may be worsened as more norepinephrine reuptake transporters are inhibited. Higher doses of [employer] would be more beneficial for bipolar gael as opposed to bipolar depression, so less likely to adjust her current Vraylar dose.      At this point, the patient appears severely depressed with a known history of numerous medication failures as documented in H&P. Though she is not actively suicidal, her overall functioning is gravely impaired by her psychiatric illness, and as such, she would probably be a good candidate for ECT if things do not progress as anticipated.     Current Facility-Administered Medications   Medication Dose Route Frequency Provider Last Rate   • acetaminophen  650 mg Oral Q6H PRN Prudstephany Ibarra MD     • acetaminophen  650 mg Oral Q4H PRN Prliban Ibarra MD     • acetaminophen  975 mg Oral Q6H PRN Prudstephany Ibarra MD     • aluminum-magnesium hydroxide-simethicone  30 mL Oral Q4H PRN Prliban Ibarra MD     • haloperidol lactate  2.5 mg Intramuscular Q4H PRN Max 4/day Marcie Ibarra MD      And   • LORazepam  1 mg Intramuscular Q4H PRN Max 4/day Marcie Ibarra MD      And   • benztropine  0.5 mg Intramuscular Q4H PRN Max 4/day Marcie Ibarra MD     • haloperidol lactate  5 mg Intramuscular Q4H PRN Max 4/day Marcie Ibarra MD      And   • LORazepam  2 mg Intramuscular Q4H PRN Max 4/day Marcie Ibarra MD      And   • benztropine  1 mg Intramuscular Q4H PRN Max 4/day Marcie Ibarra MD     • benztropine  1 mg Oral Q4H PRN Max 6/day Marcie Ibarra MD     • bisacodyl  10 mg Rectal Daily PRN Marcie Ibarra MD     • cariprazine  3 mg Oral Daily Candida Gutierrez MD     • desvenlafaxine succinate  50 mg Oral Daily Bing Poe MD     • haloperidol  1 mg Oral Q6H PRN Marcie Ibarra MD     • haloperidol  2.5 mg Oral Q4H PRN Max 4/day Marcie Ibarra MD     • haloperidol  5 mg Oral Q4H PRN Max 4/day Marcie Ibarra MD     • hydrOXYzine HCL  50 mg Oral Q6H PRN Cecily Parikh PA-C     • lisinopril  10 mg Oral Daily Prliban Ibarra MD     • LORazepam  1 mg Intramuscular Q8H PRN Cecily Parikh PA-C     • LORazepam  1 mg Oral Q8H PRN Cecily Parikh PA-C     • polyethylene glycol  17 g Oral Daily PRN Kimberly Loving MD     • propranolol  60 mg Oral Daily Bijal Peralta MD     • senna-docusate sodium  1 tablet Oral Daily PRN Kimberly Loving MD     • traZODone  50 mg Oral HS PRN Kimberly Loving MD       Risks / Benefits of Treatment:    Risks, benefits, and possible side effects of medications explained to patient and patient verbalizes understanding and agreement for treatment. Counseling / Coordination of Care:    Patient's progress discussed with staff in treatment team meeting. Medications, treatment progress and treatment plan reviewed with patient.     Lavell Botello PA-C 10/04/23

## 2023-10-04 NOTE — NURSING NOTE
Patient requested and received Trazodone 50 mg PO for sleep at 2117. Upon follow up patient is sleeping. PRN was effective.

## 2023-10-04 NOTE — NURSING NOTE
Patient continues to report high levels of depression and anxiety. Is withdrawn to her room, napping this evening, ate dinner in her room. Has a depressed/flat affect. Denies SI. Discussed coping skills for depression and anxiety. Verbalized willingness to approach staff as needed.

## 2023-10-04 NOTE — NURSING NOTE
Upon follow up for Ativan 1 mg PRN pt is resting in bed with eyes closed and appears to be sleeping. Respirations are even and unlabored. Medication seemingly effective.

## 2023-10-05 PROCEDURE — 99232 SBSQ HOSP IP/OBS MODERATE 35: CPT | Performed by: STUDENT IN AN ORGANIZED HEALTH CARE EDUCATION/TRAINING PROGRAM

## 2023-10-05 RX ADMIN — DESVENLAFAXINE 50 MG: 50 TABLET, FILM COATED, EXTENDED RELEASE ORAL at 09:16

## 2023-10-05 RX ADMIN — LISINOPRIL 10 MG: 10 TABLET ORAL at 09:16

## 2023-10-05 RX ADMIN — LORAZEPAM 1 MG: 1 TABLET ORAL at 11:58

## 2023-10-05 RX ADMIN — CARIPRAZINE 3 MG: 3 CAPSULE, GELATIN COATED ORAL at 09:16

## 2023-10-05 RX ADMIN — HYDROXYZINE HYDROCHLORIDE 50 MG: 50 TABLET, FILM COATED ORAL at 09:20

## 2023-10-05 RX ADMIN — TRAZODONE HYDROCHLORIDE 50 MG: 50 TABLET ORAL at 21:01

## 2023-10-05 RX ADMIN — PROPRANOLOL HYDROCHLORIDE 60 MG: 60 CAPSULE, EXTENDED RELEASE ORAL at 09:16

## 2023-10-05 NOTE — PROGRESS NOTES
Progress Note - Behavioral Health     Brigid Sauer 44 y.o. female MRN: [id number]   Unit/Bed#: -02 Encounter: [id number]    Behavior over the last 24 hours: unchanged. Obdulio Perez is a 76-year-old female with a history of bipolar disorder who presents for psychiatric follow-up. Staff reports that she remains isolative to her room, did come out to eat lunch and dinner yesterday. Upon approach, she remains withdrawn, guarded, depressed-appearing, delayed and psychomotor retarded. She continues to endorse feeling down, depressed and hopeless, as well as anxious. She notes perhaps a little bit more energy but continues to endorse decreased appetite and overall decreased motivation. Tolerating medications, though continues to endorse "jitteriness" which is not too bothersome at this point. No SI or HI. No AH or VH. Sleep: normal  Appetite: decreased  Medication side effects: Yes - "jiterriness"   ROS: all other systems are negative    Mental Status Evaluation:    Appearance: Hospital attire, consistent with stated age  Motor: +psychomotor retardation, no gait abnormalities, in bed  Behavior: cooperative, answers questions appropriately  Speech: soft, normal rhythm  Mood: depressed, anxious  Affect: blunted, depressed-appearing  Thought Process: linear and goal-oriented  Thought Content: denies auditory hallucinations, denies visual hallucinations, denies delusions  Risk Potential: denies suicidal ideation, plan, or intent.  Denies homicidal ideation  Sensorium: Oriented to person, place, time, and situation  Cognition: cognitive ability appears intact but was not quantitatively tested  Consciousness: alert and awake  Attention: intact, able to focus without difficulty  Insight: fair  Judgement: limited    Vital signs in last 24 hours:    Temp:  [99.3 °F (37.4 °C)] 99.3 °F (37.4 °C)  HR:  [68] 68  Resp:  [18] 18  BP: (120)/(83) 120/83    Laboratory results: I have personally reviewed all pertinent laboratory/tests results    Results from the past 24 hours: No results found for this or any previous visit (from the past 24 hour(s)). Most Recent Labs:   Lab Results   Component Value Date    WBC 5.07 09/29/2023    RBC 4.82 09/29/2023    HGB 14.0 09/29/2023    HCT 43.9 09/29/2023     09/29/2023    RDW 14.7 09/29/2023    NEUTROABS 3.00 09/29/2023    SODIUM 136 09/29/2023    K 4.0 09/29/2023     09/29/2023    CO2 24 09/29/2023    BUN 15 09/29/2023    CREATININE 0.94 09/29/2023    GLUC 93 09/29/2023    CALCIUM 9.8 09/29/2023    AST 15 09/29/2023    ALT 9 09/29/2023    ALKPHOS 72 09/29/2023    TP 7.2 09/29/2023    ALB 3.9 09/29/2023    TBILI 0.78 09/29/2023    CHOLESTEROL 234 (H) 09/29/2023    HDL 68 09/29/2023    TRIG 70 09/29/2023    LDLCALC 152 (H) 09/29/2023    [address] [facility] [address] 166 09/29/2023    [id number] 1.508 09/29/2023       Progress Toward Goals: still very depressed, does not participate in milieu therapy, does not participate in groups, stays in the room    Assessment/Plan   Principal Problem:    Bipolar disorder ([address])  Active Problems:    Hypertension    Hypercholesteremia    BV (bacterial vaginosis)    Medical clearance for psychiatric admission    Generalized anxiety disorder with panic attacks      Recommended Treatment:     Planned medication and treatment changes: All current active medications have been reviewed  Encourage group therapy, milieu therapy and occupational therapy  Behavioral Health checks every 7 minutes    Continue current medications. Discussed possibly increasing her Pristiq to 100 mg in the coming days, but patient remains hesitant due to ongoing jitteriness from the 50 mg dose. We discussed ECT as a possibility if she does not respond to current regimen of Pristiq and Vraylar. She was receptive to this conversation.     Current Facility-Administered Medications   Medication Dose Route Frequency Provider Last Rate   • acetaminophen  650 mg Oral Q6H PRN Thanh Coelho MD     • acetaminophen  650 mg Oral Q4H PRN Chan Willingham MD     • acetaminophen  975 mg Oral Q6H PRN Chan Willingham MD     • aluminum-magnesium hydroxide-simethicone  30 mL Oral Q4H PRN Chan Willingham MD     • haloperidol lactate  2.5 mg Intramuscular Q4H PRN Max 4/day Chan Willingham MD      And   • LORazepam  1 mg Intramuscular Q4H PRN Max 4/day Chan Willingham MD      And   • benztropine  0.5 mg Intramuscular Q4H PRN Max 4/day Chan Willingham MD     • haloperidol lactate  5 mg Intramuscular Q4H PRN Max 4/day Chan Willingham MD      And   • LORazepam  2 mg Intramuscular Q4H PRN Max 4/day Chan Willingham MD      And   • benztropine  1 mg Intramuscular Q4H PRN Max 4/day Chan Willingham MD     • benztropine  1 mg Oral Q4H PRN Max 6/day Chan Willingham MD     • bisacodyl  10 mg Rectal Daily PRN Chan Willingham MD     • cariprazine  3 mg Oral Daily Shauna Burnett MD     • desvenlafaxine succinate  50 mg Oral Daily Jimenez Cantor MD     • haloperidol  1 mg Oral Q6H PRN Chan Willingham MD     • haloperidol  2.5 mg Oral Q4H PRN Max 4/day Chan Willingham MD     • haloperidol  5 mg Oral Q4H PRN Max 4/day Chan Willingham MD     • hydrOXYzine HCL  50 mg Oral Q6H PRN Jem [place] PA-C     • lisinopril  10 mg Oral Daily Chan Willingham MD     • LORazepam  1 mg Intramuscular Q8H PRN Jem [place] PA-C     • LORazepam  1 mg Oral Q8H PRN Jem [place] PA-C     • polyethylene glycol  17 g Oral Daily PRN Chan Willingham MD     • propranolol  60 mg Oral Daily Jimenez Cantor MD     • senna-docusate sodium  1 tablet Oral Daily PRN Chan Willingham MD     • traZODone  50 mg Oral HS PRN Chan Willingham MD       Risks / Benefits of Treatment:    Risks, benefits, and possible side effects of medications explained to patient and patient verbalizes understanding and agreement for treatment.     Counseling / Coordination of Care:    Patient's progress discussed with staff in treatment team meeting. Medications, treatment progress and treatment plan reviewed with patient.     Parris Saul PA-C 10/05/23

## 2023-10-05 NOTE — PLAN OF CARE
Problem: Depression  Goal: Treatment Goal: Demonstrate behavioral control of depressive symptoms, verbalize feelings of improved mood/affect, and adopt new coping skills prior to discharge  Outcome: Progressing numbness present consistent with L4 dermatome./numbness present/warm/no discoloration

## 2023-10-05 NOTE — NURSING NOTE
Pt continues to have flat affect, pleasant upon approach and cooperative with staff. Remains isolative, limited socialization with peers. Pt continues to report ongoing depression and anxiety. Denies SI/HI/AVH.

## 2023-10-05 NOTE — NURSING NOTE
Pt withdrawn to room, napping throughout the day. Compliant with medication. Requesting PRN for mild anxiety. Received Atarax 50 mg po. Upon follow up, medication effective. Pt flat affect. Reports ongoing depression. Denies SI/HI or hallucination. Guarded and scant during interaction.

## 2023-10-05 NOTE — NURSING NOTE
After lunch, pt requesting medication for anxiety. Received PRN Ativan 1 mg po. Upon follow up, effective. Pt resting in bed.

## 2023-10-06 PROCEDURE — 99232 SBSQ HOSP IP/OBS MODERATE 35: CPT | Performed by: STUDENT IN AN ORGANIZED HEALTH CARE EDUCATION/TRAINING PROGRAM

## 2023-10-06 RX ORDER — TRAZODONE HYDROCHLORIDE 100 MG/1
100 TABLET ORAL
Status: DISCONTINUED | OUTPATIENT
Start: 2023-10-06 | End: 2023-10-08

## 2023-10-06 RX ADMIN — LORAZEPAM 1 MG: 1 TABLET ORAL at 09:20

## 2023-10-06 RX ADMIN — DESVENLAFAXINE 50 MG: 50 TABLET, FILM COATED, EXTENDED RELEASE ORAL at 09:20

## 2023-10-06 RX ADMIN — CARIPRAZINE 3 MG: 3 CAPSULE, GELATIN COATED ORAL at 09:20

## 2023-10-06 RX ADMIN — LISINOPRIL 10 MG: 10 TABLET ORAL at 09:20

## 2023-10-06 RX ADMIN — TRAZODONE HYDROCHLORIDE 100 MG: 100 TABLET ORAL at 20:10

## 2023-10-06 RX ADMIN — PROPRANOLOL HYDROCHLORIDE 60 MG: 60 CAPSULE, EXTENDED RELEASE ORAL at 09:20

## 2023-10-06 NOTE — NURSING NOTE
Patient withdrawn to room, reading. Pt denies SI/HI/AVH. Reports having anxiety and depression. Pt stated "I am feeling slightly improved" Pt is hopeful the medication increase will help. Staff encouraged patient to come out for meals and attend group. Pt. Denies questions or concerns at this time.

## 2023-10-06 NOTE — PLAN OF CARE
Problem: Ineffective Coping  Goal: Identifies ineffective coping skills  Outcome: Progressing  Goal: Identifies healthy coping skills  Outcome: Progressing  Goal: Demonstrates healthy coping skills  Outcome: Progressing  Goal: Patient/Family participate in treatment and DC plans  Description: Interventions:  - Provide therapeutic environment  Outcome: Progressing  Goal: Patient/Family verbalizes awareness of resources  Outcome: Progressing  Goal: Understands least restrictive measures  Description: Interventions:  - Utilize least restrictive behavior  Outcome: Progressing  Goal: Free from restraint events  Description: - Utilize least restrictive measures   - Provide behavioral interventions   - Redirect inappropriate behaviors   Outcome: Progressing     Problem: Depression  Goal: Treatment Goal: Demonstrate behavioral control of depressive symptoms, verbalize feelings of improved mood/affect, and adopt new coping skills prior to discharge  Outcome: Progressing  Goal: Verbalize thoughts and feelings  Description: Interventions:  - Assess and re-assess patient's level of risk   - Engage patient in 1:1 interactions, daily, for a minimum of 15 minutes   - Encourage patient to express feelings, fears, frustrations, hopes   Outcome: Progressing  Goal: Refrain from harming self  Description: Interventions:  - Monitor patient closely, per order   - Supervise medication ingestion, monitor effects and side effects   Outcome: Progressing  Goal: Refrain from self-neglect  Outcome: Progressing  Goal: Attend and participate in unit activities, including therapeutic, recreational, and educational groups  Description: Interventions:  - Provide therapeutic and educational activities daily, encourage attendance and participation, and document same in the medical record   Outcome: Progressing  Goal: Complete daily ADLs, including personal hygiene independently, as able  Description: Interventions:  - Observe, teach, and assist patient with ADLS  -  Monitor and promote a balance of rest/activity, with adequate nutrition and elimination   Outcome: Progressing

## 2023-10-06 NOTE — PROGRESS NOTES
Progress Note - Behavioral Health     Deny Jalloh 44 y.o. female MRN: [id number]   Unit/Bed#: -02 Encounter: [id number]    Behavior over the last 24 hours: unchanged. Henok Bustillo is a 28-year-old female with a history of bipolar depression who presents for psychiatric follow-up. Staff reports that she remains isolative to her room. She is depressed appearing, withdrawn and psychomotor retarded upon approach. Speech is a little bit less delayed today but affect remains blunted. She states that her sleep remains difficult, required as needed trazodone again. Appetite remains decreased. Concentration is intact. Continues to express feelings of anhedonia, amotivation and anergia which prevents her from getting out of bed to participate in inpatient programming. Does feel like perhaps her depressed mood is not quite as profound as it was earlier this week. Does feel like the jitteriness from her medications is beginning to improve. No SI or HI. No AH or VH. Sleep: difficulty falling asleep  Appetite: decreased  Medication side effects: Yes - jitteriness, improving   ROS: all other systems are negative    Mental Status Evaluation:    Appearance: Hospital attire, consistent with stated age  Motor: +psychomotor retardation, no gait abnormalities, in bed  Behavior: cooperative, answers questions appropriately  Speech: soft, normal rhythm  Mood: depressed, anxious  Affect: blunted, depressed-appearing  Thought Process: linear and goal-oriented  Thought Content: denies auditory hallucinations, denies visual hallucinations, denies delusions  Risk Potential: denies suicidal ideation, plan, or intent.  Denies homicidal ideation  Sensorium: Oriented to person, place, time, and situation  Cognition: cognitive ability appears intact but was not quantitatively tested  Consciousness: alert and awake  Attention: intact, able to focus without difficulty  Insight: fair  Judgement: limited    Vital signs in last 24 hours: Temp:  [97.8 °F (36.6 °C)-98.1 °F (36.7 °C)] 97.8 °F (36.6 °C)  HR:  [70-76] 70  Resp:  [16] 16  BP: ()/(72-80) 124/80    Laboratory results: I have personally reviewed all pertinent laboratory/tests results    Results from the past 24 hours: No results found for this or any previous visit (from the past 24 hour(s)). Most Recent Labs:   Lab Results   Component Value Date    WBC 5.07 09/29/2023    RBC 4.82 09/29/2023    HGB 14.0 09/29/2023    HCT 43.9 09/29/2023     09/29/2023    RDW 14.7 09/29/2023    NEUTROABS 3.00 09/29/2023    SODIUM 136 09/29/2023    K 4.0 09/29/2023     09/29/2023    CO2 24 09/29/2023    BUN 15 09/29/2023    CREATININE 0.94 09/29/2023    GLUC 93 09/29/2023    CALCIUM 9.8 09/29/2023    AST 15 09/29/2023    ALT 9 09/29/2023    ALKPHOS 72 09/29/2023    TP 7.2 09/29/2023    ALB 3.9 09/29/2023    TBILI 0.78 09/29/2023    CHOLESTEROL 234 (H) 09/29/2023    HDL 68 09/29/2023    TRIG 70 09/29/2023    LDLCALC 152 (H) 09/29/2023    [address] [facility] 166 09/29/2023    [id number] 1.508 09/29/2023       Progress Toward Goals: does not participate in milieu therapy, does not participate in groups, stays in the room    Assessment/Plan   Principal Problem:    Bipolar disorder ([address])  Active Problems:    Hypertension    Hypercholesteremia    BV (bacterial vaginosis)    Medical clearance for psychiatric admission    Generalized anxiety disorder with panic attacks      Recommended Treatment:     Planned medication and treatment changes: All current active medications have been reviewed  Encourage group therapy, milieu therapy and occupational therapy  Behavioral Health checks every 7 minutes    Start trazodone 100mg HS for sleep. Very slow progress thus far but perhaps some encouraging signs as she reports feeling somewhat less depressed today relative to earlier in her admission. She remains interested in ECT if she fails to improve on medications.     Current Facility-Administered Medications   Medication Dose Route Frequency Provider Last Rate   • acetaminophen  650 mg Oral Q6H PRN Kanu Garcia MD     • acetaminophen  650 mg Oral Q4H PRN Kanu Garcia MD     • acetaminophen  975 mg Oral Q6H PRN Kanu Garcia MD     • aluminum-magnesium hydroxide-simethicone  30 mL Oral Q4H PRN Kanu Garcia MD     • haloperidol lactate  2.5 mg Intramuscular Q4H PRN Max 4/day Kanu Garcia MD      And   • LORazepam  1 mg Intramuscular Q4H PRN Max 4/day Kanu Garcia MD      And   • benztropine  0.5 mg Intramuscular Q4H PRN Max 4/day Kanu Garcia MD     • haloperidol lactate  5 mg Intramuscular Q4H PRN Max 4/day Kanu Garcia MD      And   • LORazepam  2 mg Intramuscular Q4H PRN Max 4/day Kanu Garcia MD      And   • benztropine  1 mg Intramuscular Q4H PRN Max 4/day Kanu Garcia MD     • benztropine  1 mg Oral Q4H PRN Max 6/day Kanu Garcia MD     • bisacodyl  10 mg Rectal Daily PRN Kanu Garcia MD     • cariprazine  3 mg Oral Daily Jose Angel Aleman MD     • desvenlafaxine succinate  50 mg Oral Daily Tracy Gilbert MD     • haloperidol  1 mg Oral Q6H PRN Kanu Garcia MD     • haloperidol  2.5 mg Oral Q4H PRN Max 4/day Kanu Garcia MD     • haloperidol  5 mg Oral Q4H PRN Max 4/day Kanu Garcia MD     • hydrOXYzine HCL  50 mg Oral Q6H PRN Kate Martínez PA-C     • lisinopril  10 mg Oral Daily Kanu Garcia MD     • LORazepam  1 mg Intramuscular Q8H PRN Kate Martínez PA-C     • LORazepam  1 mg Oral Q8H PRN Kate Martínez PA-C     • polyethylene glycol  17 g Oral Daily PRN Kanu Garcia MD     • propranolol  60 mg Oral Daily Tracy Gilbert MD     • senna-docusate sodium  1 tablet Oral Daily PRN Kanu Garcia MD     • traZODone  50 mg Oral HS PRN Kanu Garcia MD       Risks / Benefits of Treatment:    Risks, benefits, and possible side effects of medications explained to patient and patient verbalizes understanding and agreement for treatment. Counseling / Coordination of Care:    Patient's progress discussed with staff in treatment team meeting. Medications, treatment progress and treatment plan reviewed with patient.     Deven Renee PA-C 10/06/23

## 2023-10-06 NOTE — NURSING NOTE
Pt continues to deny SI/HI/AVH reports ongoing depression and anxiety. Pt states she has felt very miniscule improvement in her symptoms since arriving to unit. Pt is withdrawn to room and noted to be frequently sleeping on and off throughout the day. Pt requested and received Ativan 1 mg for anxiety H: 23  Upon follow up pt is in bed laying down with eyes closed and states medication was somewhat helpful. Pt encouraged to attend group and attempt some physical activity intermittently throughout the day.

## 2023-10-07 PROCEDURE — 99232 SBSQ HOSP IP/OBS MODERATE 35: CPT | Performed by: PSYCHIATRY & NEUROLOGY

## 2023-10-07 RX ORDER — GABAPENTIN 100 MG/1
100 CAPSULE ORAL 3 TIMES DAILY
Status: DISCONTINUED | OUTPATIENT
Start: 2023-10-07 | End: 2023-10-08

## 2023-10-07 RX ADMIN — LORAZEPAM 1 MG: 1 TABLET ORAL at 13:19

## 2023-10-07 RX ADMIN — LISINOPRIL 10 MG: 10 TABLET ORAL at 08:36

## 2023-10-07 RX ADMIN — GABAPENTIN 100 MG: 100 CAPSULE ORAL at 20:18

## 2023-10-07 RX ADMIN — CARIPRAZINE 3 MG: 3 CAPSULE, GELATIN COATED ORAL at 08:36

## 2023-10-07 RX ADMIN — HYDROXYZINE HYDROCHLORIDE 50 MG: 50 TABLET, FILM COATED ORAL at 09:29

## 2023-10-07 RX ADMIN — TRAZODONE HYDROCHLORIDE 100 MG: 100 TABLET ORAL at 20:18

## 2023-10-07 RX ADMIN — GABAPENTIN 100 MG: 100 CAPSULE ORAL at 08:56

## 2023-10-07 RX ADMIN — DESVENLAFAXINE 50 MG: 50 TABLET, FILM COATED, EXTENDED RELEASE ORAL at 08:36

## 2023-10-07 RX ADMIN — GABAPENTIN 100 MG: 100 CAPSULE ORAL at 16:56

## 2023-10-07 RX ADMIN — PROPRANOLOL HYDROCHLORIDE 60 MG: 60 CAPSULE, EXTENDED RELEASE ORAL at 08:38

## 2023-10-07 NOTE — PROGRESS NOTES
Progress Note - Behavioral Health     [facility] 44 y.o. female MRN: [id number]   Unit/Bed#: -01 Encounter: [id number]    Behavior over the last 24 hours: unchanged. Kasia Crawley is a 81-year-old female with a history of bipolar disorder who presents for psychiatric follow-up. Staff reports no behavioral issues overnight. She is calm and cooperative upon approach but continues to appear very depressed, withdrawn and at times delayed. Continues to demonstrate psychomotor retardation. Continues to endorse depressive symptoms but states that perhaps these are not quite as severe as they were last week. Continues to struggle with anxiety, though states the PRNs of Ativan have been helpful. Denies any SI or HI. Denies any AH or VH. States the jitteriness from her Pristiq is beginning to resolve. Sleep: normal  Appetite: decreased  Medication side effects: No   ROS: all other systems are negative    Mental Status Evaluation:    Appearance: Hospital attire, consistent with stated age  Motor: +psychomotor retardation, no gait abnormalities, in bed  Behavior: Withdrawn, cooperative, answers questions appropriately  Speech: soft, a bit delayed today  Mood: depressed, anxious  Affect: blunted, depressed-appearing  Thought Process: linear and goal-oriented  Thought Content: denies auditory hallucinations, denies visual hallucinations, denies delusions  Risk Potential: denies suicidal ideation, plan, or intent.  Denies homicidal ideation  Sensorium: Oriented to person, place, time, and situation  Cognition: cognitive ability appears intact but was not quantitatively tested  Consciousness: alert and awake  Attention: intact, able to focus without difficulty  Insight: fair  Judgement: limited    Vital signs in last 24 hours:    Temp:  [99.3 °F (37.4 °C)-101 °F (38.3 °C)] 99.3 °F (37.4 °C)  HR:  [67-71] 67  Resp:  [16-18] 16  BP: (110-111)/(73-77) 110/77    Laboratory results: I have personally reviewed all pertinent laboratory/tests results    Results from the past 24 hours: No results found for this or any previous visit (from the past 24 hour(s)). Most Recent Labs:   Lab Results   Component Value Date    WBC 5.07 09/29/2023    RBC 4.82 09/29/2023    HGB 14.0 09/29/2023    HCT 43.9 09/29/2023     09/29/2023    RDW 14.7 09/29/2023    NEUTROABS 3.00 09/29/2023    SODIUM 136 09/29/2023    K 4.0 09/29/2023     09/29/2023    CO2 24 09/29/2023    BUN 15 09/29/2023    CREATININE 0.94 09/29/2023    GLUC 93 09/29/2023    CALCIUM 9.8 09/29/2023    AST 15 09/29/2023    ALT 9 09/29/2023    ALKPHOS 72 09/29/2023    TP 7.2 09/29/2023    ALB 3.9 09/29/2023    TBILI 0.78 09/29/2023    CHOLESTEROL 234 (H) 09/29/2023    HDL 68 09/29/2023    TRIG 70 09/29/2023    LDLCALC 152 (H) 09/29/2023    [address] [facility] [address] 166 09/29/2023    [id number] 1.508 09/29/2023       Progress Toward Goals: does not participate in milieu therapy, does not participate in groups, stays in the room    Assessment/Plan   Principal Problem:    Bipolar disorder ([address])  Active Problems:    Hypertension    Hypercholesteremia    BV (bacterial vaginosis)    Medical clearance for psychiatric admission    Generalized anxiety disorder with panic attacks      Recommended Treatment:     Planned medication and treatment changes: All current active medications have been reviewed  Encourage group therapy, milieu therapy and occupational therapy  Behavioral Health checks every 7 minutes    Incremental progression, she endorses slowly improving depressive symptoms. Add gabapentin 100 mg 3 times a day for anxiety and titrate accordingly.     Current Facility-Administered Medications   Medication Dose Route Frequency Provider Last Rate   • acetaminophen  650 mg Oral Q6H PRN Loly Marsh MD     • acetaminophen  650 mg Oral Q4H PRN Loly Marsh MD     • acetaminophen  975 mg Oral Q6H PRN Loly Marsh MD     • aluminum-magnesium hydroxide-simethicone  30 mL Oral Q4H PRN Aminah Mendez MD     • haloperidol lactate  2.5 mg Intramuscular Q4H PRN Max 4/day Aminah Mendez MD      And   • LORazepam  1 mg Intramuscular Q4H PRN Max 4/day Aminah Mendez MD      And   • benztropine  0.5 mg Intramuscular Q4H PRN Max 4/day Aminah Mendez MD     • haloperidol lactate  5 mg Intramuscular Q4H PRN Max 4/day Aminah Mendez MD      And   • LORazepam  2 mg Intramuscular Q4H PRN Max 4/day Aminah Mendez MD      And   • benztropine  1 mg Intramuscular Q4H PRN Max 4/day Aminah Mendez MD     • benztropine  1 mg Oral Q4H PRN Max 6/day Aminah Mendez MD     • bisacodyl  10 mg Rectal Daily PRN Aminah Mendez MD     • cariprazine  3 mg Oral Daily Natalie Bernal MD     • desvenlafaxine succinate  50 mg Oral Daily Selin Rosas MD     • gabapentin  100 mg Oral TID Simeon Quiles PA-C     • haloperidol  1 mg Oral Q6H PRN Aminah Mendez MD     • haloperidol  2.5 mg Oral Q4H PRN Max 4/day Aminah Mendez MD     • haloperidol  5 mg Oral Q4H PRN Max 4/day Aminah Mendez MD     • hydrOXYzine HCL  50 mg Oral Q6H PRN Simeon Quiles, PA-C     • lisinopril  10 mg Oral Daily Aminah Mendez MD     • LORazepam  1 mg Intramuscular Q8H PRN Simeon Quiles PA-C     • LORazepam  1 mg Oral Q8H PRN Simeon Quiles, PA-C     • polyethylene glycol  17 g Oral Daily PRN Aminah Mendez MD     • propranolol  60 mg Oral Daily Selin Rosas MD     • senna-docusate sodium  1 tablet Oral Daily PRN Aminah Mendez MD     • traZODone  100 mg Oral HS ANNALISA Collins-C     • traZODone  50 mg Oral HS PRN Aminah Mendez MD       Risks / Benefits of Treatment:    Risks, benefits, and possible side effects of medications explained to patient and patient verbalizes understanding and agreement for treatment. Counseling / Coordination of Care:    Patient's progress discussed with staff in treatment team meeting.   Medications, treatment progress and treatment plan reviewed with patient.     Sloane Araujo PA-C 10/07/23

## 2023-10-07 NOTE — NURSING NOTE
Patient requested and received Ativan 1 mg PO at 1319 for c/o severe anxiety. Carmen Anxiety Scale score was 26. Upon follow up at [phone], patient was resting in bed, in no apparent distress.

## 2023-10-07 NOTE — NURSING NOTE
Patient quiet, remained in room for several hours, but was present on the unit for short intervals. Remains depressed and anxious. Education provided on new drug Neurontin and questions answered. Handout provided. Denies SI / HI / hallucinations.

## 2023-10-07 NOTE — PROGRESS NOTES
Pt reported no improvement in mood and symptoms. Continues to report anxiety and depression. Withdrawn to room. Took Atarax and Ativan. Trazodone for sleep. DC: TBD - will discuss ECT if no improvement        10/06/23 4773   Team Meeting   Meeting Type Daily Rounds   Team Members Present   Team Members Present Physician;Nurse;; Other (Discipline and Name)   Physician Team Member Dr. Leann Jessica / Dr. Nicole Smith / Pola Essex Team Member Rogerio Tristan / Zurdo Treviño / Saritha Parent Management Team Member Ariana Matos / Sebas Young / Shahzad Garzon   Other (Discipline and Name) Art Blades - Art Therapy / Sigmund - Group Facilitator   Patient/Family Present   Patient Present No   Patient's Family Present No

## 2023-10-07 NOTE — TREATMENT TEAM
10/07/23 0800   Team Meeting   Meeting Type Daily Rounds   Team Members Present   Team Members Present Physician;Nurse   Physician Team Member John/[place]   Nursing Team Member Jessica   Patient/Family Present   Patient Present No   Patient's Family Present No       AM rounds- denies SI/HI, mild improvement in mood. Slept well.

## 2023-10-07 NOTE — PLAN OF CARE
Problem: Depression  Goal: Verbalize thoughts and feelings  Description: Interventions:  - Assess and re-assess patient's level of risk   - Engage patient in 1:1 interactions, daily, for a minimum of 15 minutes   - Encourage patient to express feelings, fears, frustrations, hopes   Outcome: Progressing  Goal: Refrain from harming self  Description: Interventions:  - Monitor patient closely, per order   - Supervise medication ingestion, monitor effects and side effects   Outcome: Progressing  Goal: Refrain from self-neglect  Outcome: Progressing

## 2023-10-07 NOTE — NURSING NOTE
Pt requesting prn for anxiety, given atarax 50mg at 0929 for gutierrez score 17. Pt asking about prn ativan, encouraged to try atarax first for appropriate anxiety score, pt agreed, on follow up sleeping soundly in no distress. Prn was effective in controlling anxiety.

## 2023-10-08 PROCEDURE — 99232 SBSQ HOSP IP/OBS MODERATE 35: CPT | Performed by: PSYCHIATRY & NEUROLOGY

## 2023-10-08 RX ORDER — GABAPENTIN 100 MG/1
200 CAPSULE ORAL 3 TIMES DAILY
Status: DISCONTINUED | OUTPATIENT
Start: 2023-10-08 | End: 2023-10-09

## 2023-10-08 RX ORDER — TRAZODONE HYDROCHLORIDE 150 MG/1
150 TABLET ORAL
Status: DISCONTINUED | OUTPATIENT
Start: 2023-10-08 | End: 2023-10-10

## 2023-10-08 RX ADMIN — CARIPRAZINE 3 MG: 3 CAPSULE, GELATIN COATED ORAL at 09:30

## 2023-10-08 RX ADMIN — GABAPENTIN 100 MG: 100 CAPSULE ORAL at 09:30

## 2023-10-08 RX ADMIN — GABAPENTIN 200 MG: 100 CAPSULE ORAL at 16:55

## 2023-10-08 RX ADMIN — PROPRANOLOL HYDROCHLORIDE 60 MG: 60 CAPSULE, EXTENDED RELEASE ORAL at 09:33

## 2023-10-08 RX ADMIN — DESVENLAFAXINE 50 MG: 50 TABLET, FILM COATED, EXTENDED RELEASE ORAL at 09:30

## 2023-10-08 RX ADMIN — TRAZODONE HYDROCHLORIDE 150 MG: 150 TABLET ORAL at 21:04

## 2023-10-08 RX ADMIN — GABAPENTIN 200 MG: 100 CAPSULE ORAL at 21:04

## 2023-10-08 RX ADMIN — LISINOPRIL 10 MG: 10 TABLET ORAL at 09:30

## 2023-10-08 RX ADMIN — LORAZEPAM 1 MG: 1 TABLET ORAL at 14:09

## 2023-10-08 NOTE — NURSING NOTE
Patient napped for several hours before dinner. Stated she received Ativan earlier this afternoon for anxiety and reported it was "helpful". Denies SI / HI / hallucinations. Good appetite for dinner. After dinner, was queitly walking in hallway and offered no complaints or concerns.

## 2023-10-08 NOTE — TREATMENT TEAM
10/08/23 1100   Team Meeting   Meeting Type Daily Rounds   Team Members Present   Team Members Present Physician;Nurse   Physician Team Member John/[employer]   Nursing Team Member Martha   Patient/Family Present   Patient Present No   Patient's Family Present No       AM Rounds:  anxiety, depression, napping and seclusive to room for long intervals. Visible briefly at times.

## 2023-10-08 NOTE — PLAN OF CARE
Problem: Depression  Goal: Verbalize thoughts and feelings  Description: Interventions:  - Assess and re-assess patient's level of risk   - Engage patient in 1:1 interactions, daily, for a minimum of 15 minutes   - Encourage patient to express feelings, fears, frustrations, hopes   Outcome: Progressing  Goal: Refrain from harming self  Description: Interventions:  - Monitor patient closely, per order   - Supervise medication ingestion, monitor effects and side effects   Outcome: Progressing  Goal: Refrain from self-neglect  Outcome: Progressing  Goal: Complete daily ADLs, including personal hygiene independently, as able  Description: Interventions:  - Observe, teach, and assist patient with ADLS  -  Monitor and promote a balance of rest/activity, with adequate nutrition and elimination   Outcome: Progressing

## 2023-10-08 NOTE — NURSING NOTE
Patient requested and received Ativan 1 mg PO at 1409. Carmen anxiety scale score was 29. At [phone], patient was resting in bed with eyes closed, in no apparent distress.

## 2023-10-08 NOTE — PROGRESS NOTES
Progress Note - Behavioral Health     Nabila Hall 44 y.o. female MRN: [id number]   Unit/Bed#: U 212-01 Encounter: [id number]    Behavior over the last 24 hours: unchanged. MIRTA is a 55-year-old female with a history of bipolar depression who presents for psychiatric follow-up. Staff reports no behavioral issues overnight, she remains mostly isolative to her room that was visible at brief points in the milieu yesterday. She is calm and cooperative upon approach. Responses remain somewhat delayed but affect is a little bit brighter and she reports feeling "a little better," relative to earlier in her admission. She continues to struggle with anhedonia, amotivation and anergia which prevents her from getting out of bed. She does not feel ready for discharge as she does not feel like she would be able to adequately care for herself or her 3year-old child at home. She does feel as if she is making slow and steady progress, though. Denies any SI or HI. No AH or VH. Denies any side effects to medications. Continues to have trouble initiating sleep but when she gets to sleep there are no problems. Appetite remains decreased. Sleep: difficulty falling asleep  Appetite: decreased  Medication side effects: No   ROS: all other systems are negative    Mental Status Evaluation:    Appearance: Hospital attire, consistent with stated age  Motor: +psychomotor retardation, no gait abnormalities, in bed  Behavior: Withdrawn, cooperative, answers questions appropriately  Speech: soft, still a bit delayed   Mood: depressed, anxious  Affect: blunted, depressed-appearing  Thought Process: linear and goal-oriented  Thought Content: denies auditory hallucinations, denies visual hallucinations, denies delusions  Risk Potential: denies suicidal ideation, plan, or intent.  Denies homicidal ideation  Sensorium: Oriented to person, place, time, and situation  Cognition: cognitive ability appears intact but was not quantitatively tested  Consciousness: alert and awake  Attention: intact, able to focus without difficulty  Insight: fair  Judgement: limited    Vital signs in last 24 hours:    Temp:  [99.7 °F (37.6 °C)] 99.7 °F (37.6 °C)  HR:  [72-85] 85  Resp:  [12-15] 15  BP: ()/(65-87) 119/87    Laboratory results: I have personally reviewed all pertinent laboratory/tests results    Results from the past 24 hours: No results found for this or any previous visit (from the past 24 hour(s)). Most Recent Labs:   Lab Results   Component Value Date    WBC 5.07 09/29/2023    RBC 4.82 09/29/2023    HGB 14.0 09/29/2023    HCT 43.9 09/29/2023     09/29/2023    RDW 14.7 09/29/2023    NEUTROABS 3.00 09/29/2023    SODIUM 136 09/29/2023    K 4.0 09/29/2023     09/29/2023    CO2 24 09/29/2023    BUN 15 09/29/2023    CREATININE 0.94 09/29/2023    GLUC 93 09/29/2023    CALCIUM 9.8 09/29/2023    AST 15 09/29/2023    ALT 9 09/29/2023    ALKPHOS 72 09/29/2023    TP 7.2 09/29/2023    ALB 3.9 09/29/2023    TBILI 0.78 09/29/2023    CHOLESTEROL 234 (H) 09/29/2023    HDL 68 09/29/2023    TRIG 70 09/29/2023    LDLCALC 152 (H) 09/29/2023    [address] [facility] 166 09/29/2023    [id number] 1.508 09/29/2023       Progress Toward Goals: progressing slowly    Assessment/Plan   Principal Problem:    Bipolar disorder ([address])  Active Problems:    Hypertension    Hypercholesteremia    BV (bacterial vaginosis)    Medical clearance for psychiatric admission    Generalized anxiety disorder with panic attacks      Recommended Treatment:     Planned medication and treatment changes: All current active medications have been reviewed  Encourage group therapy, milieu therapy and occupational therapy  Behavioral Health checks every 7 minutes    Increase gabapentin to 200 mg 3 times a day for anxiety. Increase trazodone to 150 mg at bedtime for sleep. Continue all other medications. Continues to make incremental progress.   Patient encouraged to be more visible in the milieu and more active in inpatient programming.     Current Facility-Administered Medications   Medication Dose Route Frequency Provider Last Rate   • acetaminophen  650 mg Oral Q6H PRN Brian Ramsey MD     • acetaminophen  650 mg Oral Q4H PRN Brian Ramsey, MD     • acetaminophen  975 mg Oral Q6H PRN Brian Ramsey, MD     • aluminum-magnesium hydroxide-simethicone  30 mL Oral Q4H PRN Brian Ramsey, MD     • haloperidol lactate  2.5 mg Intramuscular Q4H PRN Max 4/day Brian Ramsey MD      And   • LORazepam  1 mg Intramuscular Q4H PRN Max 4/day Brian Ramsey MD      And   • benztropine  0.5 mg Intramuscular Q4H PRN Max 4/day Brian Ramsey MD     • haloperidol lactate  5 mg Intramuscular Q4H PRN Max 4/day Brian Ramsey MD      And   • LORazepam  2 mg Intramuscular Q4H PRN Max 4/day Brian Ramsey MD      And   • benztropine  1 mg Intramuscular Q4H PRN Max 4/day Brian Ramsey MD     • benztropine  1 mg Oral Q4H PRN Max 6/day Brian Ramsey MD     • bisacodyl  10 mg Rectal Daily PRN Brian Ramsey MD     • cariprazine  3 mg Oral Daily Yamil sOcar MD     • desvenlafaxine succinate  50 mg Oral Daily Geovany Marie MD     • gabapentin  100 mg Oral TID ANNALISA Mcelroy-C     • haloperidol  1 mg Oral Q6H PRN Brian Ramsey MD     • haloperidol  2.5 mg Oral Q4H PRN Max 4/day Brian Ramsey MD     • haloperidol  5 mg Oral Q4H PRN Max 4/day Brian Ramsey MD     • hydrOXYzine HCL  50 mg Oral Q6H PRN ANNALISA Mcelroy-C     • lisinopril  10 mg Oral Daily Brian Ramsey MD     • LORazepam  1 mg Intramuscular Q8H PRN ANNALISA Mcelroy-C     • LORazepam  1 mg Oral Q8H PRN Fei Mendoza, PA-C     • polyethylene glycol  17 g Oral Daily PRN Brian Ramsey MD     • propranolol  60 mg Oral Daily Geovany Marie MD     • senna-docusate sodium  1 tablet Oral Daily PRN Brian Ramsey MD     • traZODone  100 mg Oral HS Simeon Quiles PA-C     • traZODone  50 mg Oral HS PRN Aminah Mendez MD       Risks / Benefits of Treatment:    Risks, benefits, and possible side effects of medications explained to patient and patient verbalizes understanding and agreement for treatment. Counseling / Coordination of Care:    Patient's progress discussed with staff in treatment team meeting. Medications, treatment progress and treatment plan reviewed with patient.     Simeon Quiles PA-C 10/08/23

## 2023-10-08 NOTE — NURSING NOTE
Pt declined breakfast, reports being tired this morning, had difficulty initiating sleep last night. Pt reports some anxiety, depression. Discussed scheduled medications, uses, side effects. Pt aware of increased dosing of Gabapentin and Trazodone. Encouraged to get OOB and walk or attend groups to help with sleep promotion at night. Pt is hopeful to have better sleep with increase in Trazodone. Encouraged to notify staff if awake overnight.

## 2023-10-09 PROCEDURE — 99232 SBSQ HOSP IP/OBS MODERATE 35: CPT | Performed by: STUDENT IN AN ORGANIZED HEALTH CARE EDUCATION/TRAINING PROGRAM

## 2023-10-09 RX ORDER — GABAPENTIN 300 MG/1
300 CAPSULE ORAL 3 TIMES DAILY
Status: DISCONTINUED | OUTPATIENT
Start: 2023-10-09 | End: 2023-10-15

## 2023-10-09 RX ADMIN — TRAZODONE HYDROCHLORIDE 150 MG: 150 TABLET ORAL at 20:21

## 2023-10-09 RX ADMIN — GABAPENTIN 300 MG: 100 CAPSULE ORAL at 15:43

## 2023-10-09 RX ADMIN — DESVENLAFAXINE 50 MG: 50 TABLET, FILM COATED, EXTENDED RELEASE ORAL at 08:49

## 2023-10-09 RX ADMIN — GABAPENTIN 200 MG: 100 CAPSULE ORAL at 08:49

## 2023-10-09 RX ADMIN — CARIPRAZINE 3 MG: 3 CAPSULE, GELATIN COATED ORAL at 08:49

## 2023-10-09 RX ADMIN — GABAPENTIN 300 MG: 100 CAPSULE ORAL at 20:17

## 2023-10-09 RX ADMIN — ACETAMINOPHEN 650 MG: 325 TABLET, FILM COATED ORAL at 12:22

## 2023-10-09 NOTE — PROGRESS NOTES
Progress Note - Behavioral Health     Iveth Mcdaniel 44 y.o. female MRN: [id number]   Unit/Bed#: U 212-01 Encounter: [id number]    Behavior over the last 24 hours: unchanged. Alaina Rivas is a 45-year-old female with a history of bipolar disorder who presents for psychiatric follow-up. Staff reports no behavioral issues overnight, she remains mostly isolative to her room with little participation in the milieu or in inpatient programming. Upon approach, she is interviewed at bedside and she continues to appear depressed, guarded, withdrawn, delayed and scant. She continues to endorse depressive symptoms of hopelessness, worthlessness, depressed mood, difficulty sleeping, poor concentration, decreased appetite, and decreased energy and motivation. She also endorses anxious symptoms of racing thoughts and ruminations. Despite these comments, she does feel slightly improved relative to prior to admission. She denies any suicidal or homicidal ideations. She denies any manic or psychotic symptoms. Tolerating medications without any issues.     Sleep: difficulty falling asleep  Appetite: decreased  Medication side effects: No   ROS: all other systems are negative    Mental Status Evaluation:    Appearance:  age appropriate, casually dressed, bit disheveled and messy   Behavior:  cooperative, calm, psychomotor retardation, slow responses   Speech:  slow, delayed, soft   Mood:  "still depressed" remains anxious   Affect:  constricted, mood-congruent   Thought Process:  organized, logical, linear   Associations: intact associations   Thought Content:  no overt delusions   Perceptual Disturbances: no auditory hallucinations, no visual hallucinations, does not appear responding to internal stimuli   Risk Potential: Suicidal ideation - None at present  Homicidal ideation - None at present  Potential for aggression - No   Sensorium:  oriented to person, place and time/date   Memory:  recent and remote memory grossly intact Consciousness:  alert and awake   Attention/Concentration: attention span and concentration appear shorter than expected for age   Insight:  fair   Judgment: fair   Gait/Station: normal gait/station, in bed   Motor Activity: no abnormal movements     Vital signs in last 24 hours:    Temp:  [97.9 °F (36.6 °C)-100.8 °F (38.2 °C)] 97.9 °F (36.6 °C)  HR:  [68-74] 68  Resp:  [16-17] 17  BP: ()/(74-79) 108/79    Laboratory results: I have personally reviewed all pertinent laboratory/tests results    Results from the past 24 hours: No results found for this or any previous visit (from the past 24 hour(s)). Most Recent Labs:   Lab Results   Component Value Date    WBC 5.07 09/29/2023    RBC 4.82 09/29/2023    HGB 14.0 09/29/2023    HCT 43.9 09/29/2023     09/29/2023    RDW 14.7 09/29/2023    NEUTROABS 3.00 09/29/2023    SODIUM 136 09/29/2023    K 4.0 09/29/2023     09/29/2023    CO2 24 09/29/2023    BUN 15 09/29/2023    CREATININE 0.94 09/29/2023    GLUC 93 09/29/2023    CALCIUM 9.8 09/29/2023    AST 15 09/29/2023    ALT 9 09/29/2023    ALKPHOS 72 09/29/2023    TP 7.2 09/29/2023    ALB 3.9 09/29/2023    TBILI 0.78 09/29/2023    CHOLESTEROL 234 (H) 09/29/2023    HDL 68 09/29/2023    TRIG 70 09/29/2023    LDLCALC 152 (H) 09/29/2023    [address] [facility] [address] 166 09/29/2023    [id number] 1.508 09/29/2023       Progress Toward Goals: progressing slowly    Assessment/Plan   Principal Problem:    Bipolar disorder ([address])  Active Problems:    Hypertension    Hypercholesteremia    BV (bacterial vaginosis)    Medical clearance for psychiatric admission    Generalized anxiety disorder with panic attacks      Recommended Treatment:     Planned medication and treatment changes: All current active medications have been reviewed  Encourage group therapy, milieu therapy and occupational therapy  Behavioral Health checks every 7 minutes    Increase gabapentin to 300 mg 3 times a day for anxiety. Continue all other medications. Patient continues to appear very depressed but feels that she is making incremental progress. Continue to monitor overall functioning this week, possible discharge by the end of the week if she continues to progress.     Current Facility-Administered Medications   Medication Dose Route Frequency Provider Last Rate   • acetaminophen  650 mg Oral Q6H PRN Margaret Heath MD     • acetaminophen  650 mg Oral Q4H PRN Margaret Heath MD     • acetaminophen  975 mg Oral Q6H PRN Margaret Heath MD     • aluminum-magnesium hydroxide-simethicone  30 mL Oral Q4H PRN Margaret Heath MD     • haloperidol lactate  2.5 mg Intramuscular Q4H PRN Max 4/day Margaret Heath MD      And   • LORazepam  1 mg Intramuscular Q4H PRN Max 4/day Margaret Heath MD      And   • benztropine  0.5 mg Intramuscular Q4H PRN Max 4/day Margaret Heath MD     • haloperidol lactate  5 mg Intramuscular Q4H PRN Max 4/day Margaret Heath MD      And   • LORazepam  2 mg Intramuscular Q4H PRN Max 4/day Margaret Heath MD      And   • benztropine  1 mg Intramuscular Q4H PRN Max 4/day Margaret Heath MD     • benztropine  1 mg Oral Q4H PRN Max 6/day Margaret Heath MD     • bisacodyl  10 mg Rectal Daily PRN Margaret Heath MD     • cariprazine  3 mg Oral Daily Rafa Toledo MD     • desvenlafaxine succinate  50 mg Oral Daily Cindy Campos MD     • gabapentin  200 mg Oral TID Cammie Mcclain PA-C     • haloperidol  1 mg Oral Q6H PRN Margaret Heath MD     • haloperidol  2.5 mg Oral Q4H PRN Max 4/day Margaret Heath MD     • haloperidol  5 mg Oral Q4H PRN Max 4/day Margaret Heath MD     • hydrOXYzine HCL  50 mg Oral Q6H PRN Cammie Mcclain PA-C     • lisinopril  10 mg Oral Daily Margaret Heath MD     • LORazepam  1 mg Intramuscular Q8H PRN Cammie Mcclain PA-C     • LORazepam  1 mg Oral Q8H PRN Cammie Mcclain PA-C     • polyethylene glycol  17 g Oral Daily PRN Margaret Heath MD     • propranolol  60 mg Oral Daily Savita Roman MD     • senna-docusate sodium  1 tablet Oral Daily PRN Vane Baum MD     • traZODone  150 mg Oral HS Lili Nielson PA-C     • traZODone  50 mg Oral HS PRN Vane Baum MD       Risks / Benefits of Treatment:    Risks, benefits, and possible side effects of medications explained to patient and patient verbalizes understanding and agreement for treatment. Counseling / Coordination of Care:    Patient's progress discussed with staff in treatment team meeting. Medications, treatment progress and treatment plan reviewed with patient.     Lili Nielson PA-C 10/09/23

## 2023-10-09 NOTE — PROGRESS NOTES
Pt denies SI/HI. Seclusive to self and room, often napping. Does not attend groups. Reported increase in Anxiety. Took Ativan. Slept. DC: LIZ      10/09/23 0834   Team Meeting   Meeting Type Daily Rounds   Team Members Present   Team Members Present Physician;Nurse;; Other (Discipline and Name)   Physician Team Member Dr. Yuri Gunn / Dr. Pippa Daugherty / Giovana Waterman / Bernardo Sole Team Member Ml Whitaker / Aries Oscar / Naldo Simms Management Team Member Jeffery Sauer / Berhane Rivera / Michell Reynoso   Other (Discipline and Name) Sigmund - Group Facilitator   Patient/Family Present   Patient Present No   Patient's Family Present No

## 2023-10-09 NOTE — NURSING NOTE
Patient has been withdrawn to the room for the morning. She is napping and reports "feeling tired." Denies SI/HI and hallucinations. Reports baseline anxiety and depression. Declined PRN medication at this time.

## 2023-10-09 NOTE — NURSING NOTE
Pk Reese is observed resting in bed and napping late this afternoon. Patient is soft-spoken, reports ongoing anxiety. Pk Reese reports poor sleep overnight and endorses feeling tired this evening. Patient remains compliant with prescribed medication and denies current SI/HI/AH/VH. Despite encouragement from staff, Pk Reese is not attending scheduled groups. Patient out of room for brief periods, ate dinner amongst peers in dining room and immediately returned to room. Pk Reese was encouraged to seek staff with any questions and/or concerns, verbalized understanding.

## 2023-10-09 NOTE — PLAN OF CARE
Problem: Ineffective Coping  Goal: Free from restraint events  Description: - Utilize least restrictive measures   - Provide behavioral interventions   - Redirect inappropriate behaviors   Outcome: Progressing     Problem: Depression  Goal: Verbalize thoughts and feelings  Description: Interventions:  - Assess and re-assess patient's level of risk   - Engage patient in 1:1 interactions, daily, for a minimum of 15 minutes   - Encourage patient to express feelings, fears, frustrations, hopes   Outcome: Progressing  Goal: Complete daily ADLs, including personal hygiene independently, as able  Description: Interventions:  - Observe, teach, and assist patient with ADLS  -  Monitor and promote a balance of rest/activity, with adequate nutrition and elimination   Outcome: Progressing     Problem: Anxiety  Goal: Anxiety is at manageable level  Description: Interventions:  - Assess and monitor patient's anxiety level. - Monitor for signs and symptoms (heart palpitations, chest pain, shortness of breath, headaches, nausea, feeling jumpy, restlessness, irritable, apprehensive). - Collaborate with interdisciplinary team and initiate plan and interventions as ordered.   - [place] patient to unit/surroundings  - Explain treatment plan  - Encourage participation in care  - Encourage verbalization of concerns/fears  - Identify coping mechanisms  - Assist in developing anxiety-reducing skills  - Administer/offer alternative therapies  - Limit or eliminate stimulants  Outcome: Progressing

## 2023-10-10 PROCEDURE — 99232 SBSQ HOSP IP/OBS MODERATE 35: CPT | Performed by: STUDENT IN AN ORGANIZED HEALTH CARE EDUCATION/TRAINING PROGRAM

## 2023-10-10 RX ORDER — ZOLPIDEM TARTRATE 5 MG/1
5 TABLET ORAL
Status: DISCONTINUED | OUTPATIENT
Start: 2023-10-10 | End: 2023-10-15

## 2023-10-10 RX ADMIN — GABAPENTIN 300 MG: 100 CAPSULE ORAL at 21:11

## 2023-10-10 RX ADMIN — LORAZEPAM 1 MG: 1 TABLET ORAL at 15:25

## 2023-10-10 RX ADMIN — GABAPENTIN 300 MG: 100 CAPSULE ORAL at 16:15

## 2023-10-10 RX ADMIN — ACETAMINOPHEN 650 MG: 325 TABLET, FILM COATED ORAL at 10:16

## 2023-10-10 RX ADMIN — DESVENLAFAXINE 50 MG: 50 TABLET, FILM COATED, EXTENDED RELEASE ORAL at 09:00

## 2023-10-10 RX ADMIN — LISINOPRIL 10 MG: 10 TABLET ORAL at 09:00

## 2023-10-10 RX ADMIN — CARIPRAZINE 3 MG: 3 CAPSULE, GELATIN COATED ORAL at 09:00

## 2023-10-10 RX ADMIN — PROPRANOLOL HYDROCHLORIDE 60 MG: 60 CAPSULE, EXTENDED RELEASE ORAL at 09:03

## 2023-10-10 RX ADMIN — GABAPENTIN 300 MG: 100 CAPSULE ORAL at 09:00

## 2023-10-10 RX ADMIN — ZOLPIDEM TARTRATE 5 MG: 5 TABLET ORAL at 21:11

## 2023-10-10 NOTE — PLAN OF CARE
Problem: Ineffective Coping  Goal: Participates in unit activities  Description: Interventions:  - Provide therapeutic environment   - Provide required programming   - Redirect inappropriate behaviors   Outcome: Not Progressing     Problem: Depression  Goal: Attend and participate in unit activities, including therapeutic, recreational, and educational groups  Description: Interventions:  - Provide therapeutic and educational activities daily, encourage attendance and participation, and document same in the medical record   Outcome: Not Progressing

## 2023-10-10 NOTE — PROGRESS NOTES
Pt withdrawn to room and self. Does not attend groups. No PRNs. Out of bed for meals and medications. DC: TBD      10/10/23 0754   Team Meeting   Meeting Type Daily Rounds   Team Members Present   Team Members Present Physician;Nurse;; Other (Discipline and Name)   Physician Team Member Dr. Jose L Brito / Dr. Moe Loera / Galindo Sigala / Daniel Foreman Team Member Sejal Salomon / Jessie Santiago Management Team Member Johnny Burnett / Sebastian Bob / Jamshid Mcdaniel   Patient/Family Present   Patient Present No   Patient's Family Present No

## 2023-10-10 NOTE — NURSING NOTE
Patient lying in bed on assessment. She reports relief from abdominal cramps since taking PRN Tylenol, however is laying down as she feels "down" today. Pt also c/o increased anxiety today, however, is unsure why. Pt declined additional medication, just requesting to rest. Denies SI. Group attendance encouraged and staff availability reinforced.

## 2023-10-10 NOTE — NURSING NOTE
Patient remains in bed, reports improvement to her anxiety and depression symptoms. When asked what changed, pt reported "the Ativan you gave me, it helped a lot." RN validated pt. She denies SI and is cooperative.

## 2023-10-10 NOTE — PROGRESS NOTES
Progress Note - Behavioral Health     Isma Fowler 44 y.o. female MRN: [id number]   Unit/Bed#: U 212-01 Encounter: [id number]    Behavior over the last 24 hours: unchanged. Lisy Davis is a 45 y/o F with PMH of bipolar depression who presents for psychiatric follow-up. She states "I am feeling more depressed today". She does not identify anything in particular that she is feeling more down about. She did not sleep well last night with reported difficulty falling and staying asleep. Reports lack of interest in attending group therapy and would rather just stay in bed. Has had minimal appetite, she did not eat anything for breakfast but had dinner last night. Denies SI/HI, auditory or visual hallucinations. Had a normal bowel movement this morning. Does not notice any side effects since increasing her gabapentin yesterday. She lacks motivation to shower and states that life at home is just hard. She remains in consideration for ECT and is potentially willing to give it a try.      Sleep: difficulty falling asleep, frequent awakenings  Appetite: poor  Medication side effects: No   ROS: all other systems are negative    Mental Status Evaluation:    Appearance:  age appropriate, disheveled   Behavior:  calm, psychomotor retardation   Speech:  decreased rate, slow, soft, still slight delay   Mood:  depressed   Affect:  blunted   Thought Process:  logical, linear   Associations: intact associations   Thought Content:  normal, no overt delusions   Perceptual Disturbances: no auditory hallucinations, no visual hallucinations   Risk Potential: Suicidal ideation - None at present  Homicidal ideation - None at present  Potential for aggression - No   Sensorium:  oriented to person, place and time/date   Memory:  recent and remote memory grossly intact   Consciousness:  alert and awake   Attention/Concentration: attention span and concentration appear shorter than expected for age   Insight:  fair   Judgment: fair Gait/Station: normal gait/station   Motor Activity: no abnormal movements     Vital signs in last 24 hours:    Temp:  [98.6 °F (37 °C)-100.3 °F (37.9 °C)] 100.3 °F (37.9 °C)  HR:  [76-80] 80  Resp:  [17-18] 18  BP: (109-145)/(83-89) 145/89    Laboratory results: I have personally reviewed all pertinent laboratory/tests results    Results from the past 24 hours: No results found for this or any previous visit (from the past 24 hour(s)). Most Recent Labs:   Lab Results   Component Value Date    WBC 5.07 09/29/2023    RBC 4.82 09/29/2023    HGB 14.0 09/29/2023    HCT 43.9 09/29/2023     09/29/2023    RDW 14.7 09/29/2023    NEUTROABS 3.00 09/29/2023    SODIUM 136 09/29/2023    K 4.0 09/29/2023     09/29/2023    CO2 24 09/29/2023    BUN 15 09/29/2023    CREATININE 0.94 09/29/2023    GLUC 93 09/29/2023    CALCIUM 9.8 09/29/2023    AST 15 09/29/2023    ALT 9 09/29/2023    ALKPHOS 72 09/29/2023    TP 7.2 09/29/2023    ALB 3.9 09/29/2023    TBILI 0.78 09/29/2023    CHOLESTEROL 234 (H) 09/29/2023    HDL 68 09/29/2023    TRIG 70 09/29/2023    LDLCALC 152 (H) 09/29/2023    [address] [facility] [address] 09/29/2023    [id number] 1.508 09/29/2023       Progress Toward Goals: progressing    Assessment/Plan   Principal Problem:    Bipolar disorder ([address])  Active Problems:    Hypertension    Hypercholesteremia    BV (bacterial vaginosis)    Medical clearance for psychiatric admission    Generalized anxiety disorder with panic attacks      Recommended Treatment:     Planned medication and treatment changes: All current active medications have been reviewed  Encourage group therapy, milieu therapy and occupational therapy  Behavioral Health checks every 7 minutes    Discontinue trazodone 150 mg due to minimal benefit and start Ambien 5 mg qhs. No success with melatonin, doxepin, Remeron, etc. in the past.     Patient is still considering ECT. Discussed with patient the possibility of trialing Lamictal again.  She tried it in the past at a 50 mg dose (subtherapeutic) and states that it worked initially but eventually stopped working and she did not titrate futher. Continue all other medications.      Current Facility-Administered Medications   Medication Dose Route Frequency Provider Last Rate   • acetaminophen  650 mg Oral Q6H PRN Alice Chowdhury MD     • acetaminophen  650 mg Oral Q4H PRN Alice Chowdhury MD     • acetaminophen  975 mg Oral Q6H PRN Alice Chowdhury MD     • aluminum-magnesium hydroxide-simethicone  30 mL Oral Q4H PRN Alice Chowdhury MD     • haloperidol lactate  2.5 mg Intramuscular Q4H PRN Max 4/day Alice Chowdhury MD      And   • LORazepam  1 mg Intramuscular Q4H PRN Max 4/day Alice Chowdhury MD      And   • benztropine  0.5 mg Intramuscular Q4H PRN Max 4/day Alice Chowdhury MD     • haloperidol lactate  5 mg Intramuscular Q4H PRN Max 4/day Alice Chowdhury MD      And   • LORazepam  2 mg Intramuscular Q4H PRN Max 4/day Alice Chowdhury MD      And   • benztropine  1 mg Intramuscular Q4H PRN Max 4/day Alice Chowdhury MD     • benztropine  1 mg Oral Q4H PRN Max 6/day Alice Chowdhury MD     • bisacodyl  10 mg Rectal Daily PRN Alice Chowdhury MD     • cariprazine  3 mg Oral Daily Michael Medley MD     • desvenlafaxine succinate  50 mg Oral Daily Ralph Pollard MD     • gabapentin  300 mg Oral TID Susie Sanders PA-C     • haloperidol  1 mg Oral Q6H PRN Alice Chowdhury MD     • haloperidol  2.5 mg Oral Q4H PRN Max 4/day Alice Chowdhury MD     • haloperidol  5 mg Oral Q4H PRN Max 4/day Alice Chowdhury MD     • hydrOXYzine HCL  50 mg Oral Q6H PRN Susie Sanders PA-C     • lisinopril  10 mg Oral Daily Alice Chowdhury MD     • LORazepam  1 mg Intramuscular Q8H PRN Susie Sanders PA-C     • LORazepam  1 mg Oral Q8H PRN Susie Sanders PA-C     • polyethylene glycol  17 g Oral Daily PRN Alice Chowdhury MD     • propranolol  60 mg Oral Daily [employer] Danielle Barber MD     • senna-docusate sodium  1 tablet Oral Daily PRN Vitor Santos MD     • traZODone  150 mg Oral HS Cristopher Patterson PA-C     • traZODone  50 mg Oral HS PRMARIE Santos MD       Risks / Benefits of Treatment:    Risks, benefits, and possible side effects of medications explained to patient and patient verbalizes understanding and agreement for treatment. Counseling / Coordination of Care:    Patient's progress discussed with staff in treatment team meeting. Medications, treatment progress and treatment plan reviewed with patient.     Cristopher Patterson PA-C 10/10/23

## 2023-10-10 NOTE — PLAN OF CARE
Problem: Depression  Goal: Treatment Goal: Demonstrate behavioral control of depressive symptoms, verbalize feelings of improved mood/affect, and adopt new coping skills prior to discharge  Outcome: Progressing  Goal: Verbalize thoughts and feelings  Description: Interventions:  - Assess and re-assess patient's level of risk   - Engage patient in 1:1 interactions, daily, for a minimum of 15 minutes   - Encourage patient to express feelings, fears, frustrations, hopes   Outcome: Progressing  Goal: Refrain from harming self  Description: Interventions:  - Monitor patient closely, per order   - Supervise medication ingestion, monitor effects and side effects   Outcome: Progressing  Goal: Refrain from isolation  Description: Interventions:  - Develop a trusting relationship   - Encourage socialization   Outcome: Progressing  Goal: Refrain from self-neglect  Outcome: Progressing  Goal: Attend and participate in unit activities, including therapeutic, recreational, and educational groups  Description: Interventions:  - Provide therapeutic and educational activities daily, encourage attendance and participation, and document same in the medical record   Outcome: Progressing  Goal: Complete daily ADLs, including personal hygiene independently, as able  Description: Interventions:  - Observe, teach, and assist patient with ADLS  -  Monitor and promote a balance of rest/activity, with adequate nutrition and elimination   Outcome: Progressing     Problem: Anxiety  Goal: Anxiety is at manageable level  Description: Interventions:  - Assess and monitor patient's anxiety level. - Monitor for signs and symptoms (heart palpitations, chest pain, shortness of breath, headaches, nausea, feeling jumpy, restlessness, irritable, apprehensive). - Collaborate with interdisciplinary team and initiate plan and interventions as ordered.   - [place] patient to unit/surroundings  - Explain treatment plan  - Encourage participation in care  - Encourage verbalization of concerns/fears  - Identify coping mechanisms  - Assist in developing anxiety-reducing skills  - Administer/offer alternative therapies  - Limit or eliminate stimulants  Outcome: Progressing

## 2023-10-11 PROCEDURE — 99232 SBSQ HOSP IP/OBS MODERATE 35: CPT | Performed by: STUDENT IN AN ORGANIZED HEALTH CARE EDUCATION/TRAINING PROGRAM

## 2023-10-11 RX ORDER — LAMOTRIGINE 25 MG/1
25 TABLET ORAL DAILY
Status: DISCONTINUED | OUTPATIENT
Start: 2023-10-11 | End: 2023-10-12

## 2023-10-11 RX ADMIN — LAMOTRIGINE 25 MG: 25 TABLET ORAL at 11:18

## 2023-10-11 RX ADMIN — ZOLPIDEM TARTRATE 5 MG: 5 TABLET ORAL at 21:01

## 2023-10-11 RX ADMIN — GABAPENTIN 300 MG: 100 CAPSULE ORAL at 09:23

## 2023-10-11 RX ADMIN — DESVENLAFAXINE 50 MG: 50 TABLET, FILM COATED, EXTENDED RELEASE ORAL at 09:22

## 2023-10-11 RX ADMIN — LORAZEPAM 1 MG: 1 TABLET ORAL at 13:11

## 2023-10-11 RX ADMIN — CARIPRAZINE 3 MG: 3 CAPSULE, GELATIN COATED ORAL at 09:22

## 2023-10-11 RX ADMIN — GABAPENTIN 300 MG: 100 CAPSULE ORAL at 21:01

## 2023-10-11 RX ADMIN — ACETAMINOPHEN 650 MG: 325 TABLET, FILM COATED ORAL at 13:08

## 2023-10-11 RX ADMIN — GABAPENTIN 300 MG: 100 CAPSULE ORAL at 15:53

## 2023-10-11 NOTE — NURSING NOTE
Pt is withdrawn to room and is not participating in offered groups. She denies SI/HI/AVH and continues to endorse anxiety and depression. She reports not feeling much a difference since arriving to unit and is frequently noted to be napping.

## 2023-10-11 NOTE — PLAN OF CARE
Problem: Ineffective Coping  Goal: Identifies ineffective coping skills  Outcome: Progressing  Goal: Identifies healthy coping skills  Outcome: Progressing  Goal: Demonstrates healthy coping skills  Outcome: Progressing  Goal: Participates in unit activities  Description: Interventions:  - Provide therapeutic environment   - Provide required programming   - Redirect inappropriate behaviors   Outcome: Progressing  Goal: Patient/Family participate in treatment and DC plans  Description: Interventions:  - Provide therapeutic environment  Outcome: Progressing  Goal: Patient/Family verbalizes awareness of resources  Outcome: Progressing  Goal: Understands least restrictive measures  Description: Interventions:  - Utilize least restrictive behavior  Outcome: Progressing  Goal: Free from restraint events  Description: - Utilize least restrictive measures   - Provide behavioral interventions   - Redirect inappropriate behaviors   Outcome: Progressing     Problem: Depression  Goal: Treatment Goal: Demonstrate behavioral control of depressive symptoms, verbalize feelings of improved mood/affect, and adopt new coping skills prior to discharge  Outcome: Progressing  Goal: Verbalize thoughts and feelings  Description: Interventions:  - Assess and re-assess patient's level of risk   - Engage patient in 1:1 interactions, daily, for a minimum of 15 minutes   - Encourage patient to express feelings, fears, frustrations, hopes   Outcome: Progressing  Goal: Refrain from harming self  Description: Interventions:  - Monitor patient closely, per order   - Supervise medication ingestion, monitor effects and side effects   Outcome: Progressing  Goal: Refrain from isolation  Description: Interventions:  - Develop a trusting relationship   - Encourage socialization   Outcome: Progressing  Goal: Refrain from self-neglect  Outcome: Progressing  Goal: Attend and participate in unit activities, including therapeutic, recreational, and educational groups  Description: Interventions:  - Provide therapeutic and educational activities daily, encourage attendance and participation, and document same in the medical record   Outcome: Progressing  Goal: Complete daily ADLs, including personal hygiene independently, as able  Description: Interventions:  - Observe, teach, and assist patient with ADLS  -  Monitor and promote a balance of rest/activity, with adequate nutrition and elimination   Outcome: Progressing     Problem: Anxiety  Goal: Anxiety is at manageable level  Description: Interventions:  - Assess and monitor patient's anxiety level. - Monitor for signs and symptoms (heart palpitations, chest pain, shortness of breath, headaches, nausea, feeling jumpy, restlessness, irritable, apprehensive). - Collaborate with interdisciplinary team and initiate plan and interventions as ordered.   - [place] patient to unit/surroundings  - Explain treatment plan  - Encourage participation in care  - Encourage verbalization of concerns/fears  - Identify coping mechanisms  - Assist in developing anxiety-reducing skills  - Administer/offer alternative therapies  - Limit or eliminate stimulants  Outcome: Progressing

## 2023-10-11 NOTE — PROGRESS NOTES
Progress Note - Behavioral Health     Katty Toro 44 y.o. female MRN: [id number]   Unit/Bed#: U 212-01 Encounter: [id number]    Behavior over the last 24 hours: unchanged. Kierra Vallecillo is a 43 y/o F with PMH of bipolar depression presenting for psychiatric follow-up. She reports that she is still feeling depressed and anxious today at about the same level as yesterday. She did sleep better last night after addition of Ambien, with ease of falling asleep and staying asleep. She still reports low energy but was up and walking last night as well as watching TV with her peers. Overall, participation in inpatient programming has been very limited to this point. Poor appetite remains and she did not eat breakfast this morning. Denies SI/HI. No issues with bowel movements or urination.      Sleep: improved, slept better  Appetite: poor  Medication side effects: No   ROS: all other systems are negative    Mental Status Evaluation:    Appearance:  age appropriate, disheveled   Behavior:  calm, psychomotor retardation (improving)   Speech:  slow, soft   Mood:  depressed, anxious   Affect:  constricted   Thought Process:  logical, linear   Associations: intact associations   Thought Content:  no overt delusions   Perceptual Disturbances: no auditory hallucinations, no visual hallucinations   Risk Potential: Suicidal ideation - None at present  Homicidal ideation - None at present  Potential for aggression - No   Sensorium:  oriented to person, place, and time/date   Memory:  recent and remote memory grossly intact   Consciousness:  alert and awake   Attention/Concentration: attention span and concentration appear shorter than expected for age   Insight:  fair   Judgment: fair   Gait/Station: normal gait/station, in bed   Motor Activity: no abnormal movements     Vital signs in last 24 hours:    Temp:  [97.8 °F (36.6 °C)-99.5 °F (37.5 °C)] 97.8 °F (36.6 °C)  HR:  [71-80] 71  Resp:  [16-18] 16  BP: (105-116)/(75-88) 105/75    Laboratory results: I have personally reviewed all pertinent laboratory/tests results    Results from the past 24 hours: No results found for this or any previous visit (from the past 24 hour(s)). Most Recent Labs:   Lab Results   Component Value Date    WBC 5.07 09/29/2023    RBC 4.82 09/29/2023    HGB 14.0 09/29/2023    HCT 43.9 09/29/2023     09/29/2023    RDW 14.7 09/29/2023    NEUTROABS 3.00 09/29/2023    SODIUM 136 09/29/2023    K 4.0 09/29/2023     09/29/2023    CO2 24 09/29/2023    BUN 15 09/29/2023    CREATININE 0.94 09/29/2023    GLUC 93 09/29/2023    CALCIUM 9.8 09/29/2023    AST 15 09/29/2023    ALT 9 09/29/2023    ALKPHOS 72 09/29/2023    TP 7.2 09/29/2023    ALB 3.9 09/29/2023    TBILI 0.78 09/29/2023    CHOLESTEROL 234 (H) 09/29/2023    HDL 68 09/29/2023    TRIG 70 09/29/2023    LDLCALC 152 (H) 09/29/2023    [address] [facility] 166 09/29/2023    [id number] 1.508 09/29/2023       Progress Toward Goals: progressing    Assessment/Plan   Principal Problem:    Bipolar disorder ([address])  Active Problems:    Hypertension    Hypercholesteremia    BV (bacterial vaginosis)    Medical clearance for psychiatric admission    Generalized anxiety disorder with panic attacks      Recommended Treatment:     Planned medication and treatment changes: All current active medications have been reviewed  Encourage group therapy, milieu therapy and occupational therapy  Behavioral Health checks every 7 minutes    Discussed with patient the continued possibilities of ECT vs Lamictal. Pt is interested in trialing the Lamictal again. Pt notes that the first time she took Lamictal she developed a rash on her chest and arms a few weeks after initiating the medication. The rash improved when the medication was discontinued, however, she never experienced systemic symptoms which would be indicative of SJS or a more severe rash.  She was restarted on Lamictal recently and reached a dose of 50 mg, and never developed a rash. The medication was discontinued due to "lack of improvement." We will start Lamictal 25 mg daily x 2 weeks, with plans to the increase to 50 mg x 2 weeks, and then titrate to 100 mg. Will monitor closely for signs of rash. Patient verbalizes understanding of side effects with Lamictal and is agreeable to start here on the unit. At this point, understand that benefits likely outweigh the risks of treatment. Continue all other medications.      Current Facility-Administered Medications   Medication Dose Route Frequency Provider Last Rate    acetaminophen  650 mg Oral Q6H PRN Aminah Mendez MD      acetaminophen  650 mg Oral Q4H PRN Aminah Mendez MD      acetaminophen  975 mg Oral Q6H PRN Aminah Mendez MD      aluminum-magnesium hydroxide-simethicone  30 mL Oral Q4H PRN Aminah Mendez MD      haloperidol lactate  2.5 mg Intramuscular Q4H PRN Max 4/day Aminah Mendez MD      And    LORazepam  1 mg Intramuscular Q4H PRN Max 4/day Aminah Mendez MD      And    benztropine  0.5 mg Intramuscular Q4H PRN Max 4/day Aminah Mendez MD      haloperidol lactate  5 mg Intramuscular Q4H PRN Max 4/day Aminah Mendez MD      And    LORazepam  2 mg Intramuscular Q4H PRN Max 4/day Aminah Mendez MD      And    benztropine  1 mg Intramuscular Q4H PRN Max 4/day Aminah Mendez MD      benztropine  1 mg Oral Q4H PRN Max 6/day Aminah Mendez MD      bisacodyl  10 mg Rectal Daily PRN Aminah Mendez MD      cariprazine  3 mg Oral Daily Natalie Bernal MD      desvenlafaxine succinate  50 mg Oral Daily Selin Rosas MD      gabapentin  300 mg Oral TID Simeon Quiles PA-C      haloperidol  1 mg Oral Q6H PRN Aminah Mendez MD      haloperidol  2.5 mg Oral Q4H PRN Max 4/day Aminah Mendez MD      haloperidol  5 mg Oral Q4H PRN Max 4/day Aminah Mendez MD      hydrOXYzine HCL  50 mg Oral Q6H PRN Simeon Quiles PA-C      lamoTRIgine  25 mg Oral Daily Mamta Pool Nic Ramos PA-C      lisinopril  10 mg Oral Daily Darcy Jade MD      LORazepam  1 mg Intramuscular Q8H PRN Christoph Amador PA-C      LORazepam  1 mg Oral Q8H PRN Christoph Amador PA-C      polyethylene glycol  17 g Oral Daily PRN Darcy Jade MD      propranolol  60 mg Oral Daily Linwood Gregorio MD      senna-docusate sodium  1 tablet Oral Daily PRN Darcy Jade MD      traZODone  50 mg Oral HS PRN Darcy Jade MD      zolpidem  5 mg Oral HS Christoph Amador PA-C       Risks / Benefits of Treatment:    Risks, benefits, and possible side effects of medications explained to patient and patient verbalizes understanding and agreement for treatment. Counseling / Coordination of Care:    Patient's progress discussed with staff in treatment team meeting. Medications, treatment progress and treatment plan reviewed with patient.     Christoph Amador PA-C 10/11/23

## 2023-10-11 NOTE — NURSING NOTE
Pt was seen withdrawn to her room stating that she is feeling anxious and depressed. Denies SI, HI, and AVH. Reports adequate sleep and appetite. Noticeable fine tremors noted on bilateral hands. Encouraged pt to attend groups and to inform staff if anxiety worsens as PRNs are available.

## 2023-10-11 NOTE — NURSING NOTE
Pt requested and received Ativan 1 mg for anxiety H: 20 Upon follow up pt is resting in bed with her eyes closed. Respirations are even and unlabored. Pt also c/o a headache 4/10 and was given tylenol 650 mg. Both medications appear to be effective as pt is not in distress.

## 2023-10-12 PROCEDURE — 99232 SBSQ HOSP IP/OBS MODERATE 35: CPT | Performed by: STUDENT IN AN ORGANIZED HEALTH CARE EDUCATION/TRAINING PROGRAM

## 2023-10-12 RX ADMIN — LAMOTRIGINE 25 MG: 25 TABLET ORAL at 08:13

## 2023-10-12 RX ADMIN — GABAPENTIN 300 MG: 100 CAPSULE ORAL at 21:06

## 2023-10-12 RX ADMIN — CARIPRAZINE 3 MG: 3 CAPSULE, GELATIN COATED ORAL at 08:13

## 2023-10-12 RX ADMIN — PROPRANOLOL HYDROCHLORIDE 60 MG: 60 CAPSULE, EXTENDED RELEASE ORAL at 08:13

## 2023-10-12 RX ADMIN — ZOLPIDEM TARTRATE 5 MG: 5 TABLET ORAL at 21:06

## 2023-10-12 RX ADMIN — GABAPENTIN 300 MG: 100 CAPSULE ORAL at 16:29

## 2023-10-12 RX ADMIN — LISINOPRIL 10 MG: 10 TABLET ORAL at 08:13

## 2023-10-12 RX ADMIN — GABAPENTIN 300 MG: 100 CAPSULE ORAL at 08:13

## 2023-10-12 RX ADMIN — DESVENLAFAXINE 50 MG: 50 TABLET, FILM COATED, EXTENDED RELEASE ORAL at 08:13

## 2023-10-12 RX ADMIN — LORAZEPAM 1 MG: 1 TABLET ORAL at 13:45

## 2023-10-12 NOTE — NURSING NOTE
F/u ativan 1mg Po. Pt reports the PRN was effective.  She was having increased anxiety d/t side effects of lamictal.

## 2023-10-12 NOTE — QUICK NOTE
Patient alerted nursing staff that she is experiencing dysuria and increased frequency of urination related to Lamictal.  She states that she had experienced this unusual side effect twice before when she was previously on this medication. She states that she saw multiple doctors including a urologist who concluded that her issues were unrelated to the genitourinary system. She was told that her issue was from the Lamictal and her symptoms resolved within 24-48 hours upon discontinuation of the Lamictal in the past.  Patient failed to mention these issues during both today's and yesterday's encounters. She no longer wishes to continue the Lamictal.  Discussed other options for treatment of bipolar depression, patient hesitant to try other options as she has previously experienced either poor efficacy or unusual side effects on most other options. She will continue on combination of Vraylar and Pristiq for now.

## 2023-10-12 NOTE — PLAN OF CARE
Patient is declining all unit activities and therapeutic groups at this time.     Problem: Ineffective Coping  Goal: Participates in unit activities  Description: Interventions:  - Provide therapeutic environment   - Provide required programming   - Redirect inappropriate behaviors   Outcome: Not Progressing     Problem: Depression  Goal: Attend and participate in unit activities, including therapeutic, recreational, and educational groups  Description: Interventions:  - Provide therapeutic and educational activities daily, encourage attendance and participation, and document same in the medical record   Outcome: Not Progressing

## 2023-10-12 NOTE — NURSING NOTE
Patient reports she slept well last night. Reports her mood is stable and she has low energy. Denies SI/HI and hallucinations. Withdrawn to her room a majority of the shift.

## 2023-10-12 NOTE — PROGRESS NOTES
Progress Note - Behavioral Health     Jodi Williamson 44 y.o. female MRN: [id number]   Unit/Bed#: -01 Encounter: [id number]    Behavior over the last 24 hours: unchanged. Yuki Klein is a 45 y/o F with PMH of bipolar depression presenting for psychiatric follow-up. No significant overnight events. Patient reports minimal improvement in mood since yesterday. Patient reports that she slept better last night with the Ambien again. She still reports low energy but is partially participating in groups and walking around the halls. She reports slight improvement in appetite, and has been eating lunch and dinner. She has been reading her book but reports she has to stop at times because she loses focus. Did have to take PRN Ativan yesterday for anxiety, noting this is the only thing that helps her anxiety. Lamictal was just recently restarted yesterday, but no side effects so far. Denies SI/HI. Denies hallucinations.      Sleep: improved, slept better  Appetite: fair  Medication side effects: No   ROS: all other systems are negative    Mental Status Evaluation:    Appearance:  age appropriate, wearing pajamas, disheveled   Behavior:  calm, psychomotor retardation (improving)   Speech:  slow, soft, still slight delay   Mood:  depressed, anxious   Affect:  constricted, mood-congruent   Thought Process:  organized, logical, linear   Associations: intact associations   Thought Content:  no overt delusions   Perceptual Disturbances: no auditory hallucinations, no visual hallucinations   Risk Potential: Suicidal ideation - None at present  Homicidal ideation - None at present  Potential for aggression - No   Sensorium:  oriented to person, place, and time/date   Memory:  recent and remote memory grossly intact   Consciousness:  alert and awake   Attention/Concentration: attention span and concentration appear shorter than expected for age   Insight:  fair   Judgment: fair   Gait/Station: normal gait/station, in bed   Motor Activity: no abnormal movements     Vital signs in last 24 hours:    Temp:  [99.4 °F (37.4 °C)-101.6 °F (38.7 °C)] 99.4 °F (37.4 °C)  HR:  [76-83] 76  Resp:  [18] 18  BP: (117-124)/(85-87) 117/85    Laboratory results: I have personally reviewed all pertinent laboratory/tests results    Results from the past 24 hours: No results found for this or any previous visit (from the past 24 hour(s)). Most Recent Labs:   Lab Results   Component Value Date    WBC 5.07 09/29/2023    RBC 4.82 09/29/2023    HGB 14.0 09/29/2023    HCT 43.9 09/29/2023     09/29/2023    RDW 14.7 09/29/2023    NEUTROABS 3.00 09/29/2023    SODIUM 136 09/29/2023    K 4.0 09/29/2023     09/29/2023    CO2 24 09/29/2023    BUN 15 09/29/2023    CREATININE 0.94 09/29/2023    GLUC 93 09/29/2023    CALCIUM 9.8 09/29/2023    AST 15 09/29/2023    ALT 9 09/29/2023    ALKPHOS 72 09/29/2023    TP 7.2 09/29/2023    ALB 3.9 09/29/2023    TBILI 0.78 09/29/2023    CHOLESTEROL 234 (H) 09/29/2023    HDL 68 09/29/2023    TRIG 70 09/29/2023    LDLCALC 152 (H) 09/29/2023    [address] [employer] [facility] 166 09/29/2023    [id number] 1.508 09/29/2023       Progress Toward Goals: progressing    Assessment/Plan   Principal Problem:    Bipolar disorder ([address])  Active Problems:    Hypertension    Hypercholesteremia    BV (bacterial vaginosis)    Medical clearance for psychiatric admission    Generalized anxiety disorder with panic attacks      Recommended Treatment:     Planned medication and treatment changes: All current active medications have been reviewed  Encourage group therapy, milieu therapy and occupational therapy  Behavioral Health checks every 7 minutes    No issues with Lamictal, we will continue on regular titration schedule if tolerated. Patient is agreeable to plan and not experiencing side effects. Closely monitor for side effects and signs of rash. Continue all other medications. Encourage group participation.       Current Facility-Administered Medications Medication Dose Route Frequency Provider Last Rate    acetaminophen  650 mg Oral Q6H PRN Eric Chau MD      acetaminophen  650 mg Oral Q4H PRN Eric Chau MD      acetaminophen  975 mg Oral Q6H PRN Eric Chau MD      aluminum-magnesium hydroxide-simethicone  30 mL Oral Q4H PRN Eric Chau MD      haloperidol lactate  2.5 mg Intramuscular Q4H PRN Max 4/day Eric Chau MD      And    LORazepam  1 mg Intramuscular Q4H PRN Max 4/day Eric Chau MD      And    benztropine  0.5 mg Intramuscular Q4H PRN Max 4/day Eric Chau MD      haloperidol lactate  5 mg Intramuscular Q4H PRN Max 4/day Eric Chau MD      And    LORazepam  2 mg Intramuscular Q4H PRN Max 4/day Eric Chau MD      And    benztropine  1 mg Intramuscular Q4H PRN Max 4/day Eric Chau MD      benztropine  1 mg Oral Q4H PRN Max 6/day Eric Chau MD      bisacodyl  10 mg Rectal Daily PRN Eric Chau MD      cariprazine  3 mg Oral Daily Jamshid Lin MD      desvenlafaxine succinate  50 mg Oral Daily Adore Caba MD      gabapentin  300 mg Oral TID Sarthak Sanches PA-C      haloperidol  1 mg Oral Q6H PRN Eric Chau MD      haloperidol  2.5 mg Oral Q4H PRN Max 4/day Eric Chau MD      haloperidol  5 mg Oral Q4H PRN Max 4/day Eric Chau MD      hydrOXYzine HCL  50 mg Oral Q6H PRN Sarthak Sanches PA-C      lamoTRIgine  25 mg Oral Daily Sarthak Sanches PA-C      lisinopril  10 mg Oral Daily Eric Chau MD      LORazepam  1 mg Intramuscular Q8H PRN Sarthak Sanches PA-C      LORazepam  1 mg Oral Q8H PRN Sarthak Sanches PA-C      polyethylene glycol  17 g Oral Daily PRN Eric Chau MD      propranolol  60 mg Oral Daily Adore Caba MD      senna-docusate sodium  1 tablet Oral Daily PRN Eric Chau MD      traZODone  50 mg Oral HS PRN Eric Chau MD      zolpidem  5 mg Oral HS Sarthak Sanches, JADE       Risks / Benefits of Treatment:    Risks, benefits, and possible side effects of medications explained to patient and patient verbalizes understanding and agreement for treatment. Counseling / Coordination of Care:    Patient's progress discussed with staff in treatment team meeting. Medications, treatment progress and treatment plan reviewed with patient.     Areli Marie PA-C 10/12/23

## 2023-10-13 PROCEDURE — 99232 SBSQ HOSP IP/OBS MODERATE 35: CPT | Performed by: PHYSICIAN ASSISTANT

## 2023-10-13 RX ORDER — DIVALPROEX SODIUM 500 MG/1
500 TABLET, EXTENDED RELEASE ORAL
Status: DISCONTINUED | OUTPATIENT
Start: 2023-10-13 | End: 2023-10-15

## 2023-10-13 RX ADMIN — LISINOPRIL 10 MG: 10 TABLET ORAL at 08:33

## 2023-10-13 RX ADMIN — PROPRANOLOL HYDROCHLORIDE 60 MG: 60 CAPSULE, EXTENDED RELEASE ORAL at 08:33

## 2023-10-13 RX ADMIN — DESVENLAFAXINE 50 MG: 50 TABLET, FILM COATED, EXTENDED RELEASE ORAL at 08:33

## 2023-10-13 RX ADMIN — CARIPRAZINE 3 MG: 3 CAPSULE, GELATIN COATED ORAL at 08:33

## 2023-10-13 RX ADMIN — ZOLPIDEM TARTRATE 5 MG: 5 TABLET ORAL at 21:00

## 2023-10-13 RX ADMIN — HYDROXYZINE HYDROCHLORIDE 50 MG: 50 TABLET, FILM COATED ORAL at 08:35

## 2023-10-13 RX ADMIN — GABAPENTIN 300 MG: 100 CAPSULE ORAL at 21:00

## 2023-10-13 RX ADMIN — DIVALPROEX SODIUM 500 MG: 500 TABLET, EXTENDED RELEASE ORAL at 21:00

## 2023-10-13 RX ADMIN — GABAPENTIN 300 MG: 100 CAPSULE ORAL at 15:44

## 2023-10-13 RX ADMIN — GABAPENTIN 300 MG: 100 CAPSULE ORAL at 08:33

## 2023-10-13 NOTE — NURSING NOTE
Patient remains compliant with medications and unit routine. She reports some improvement in her mood. She is pleasant. Using coping skills and attending groups. Pt denies SI, HI, AVH. She reports good appetite and improvement in sleep. Does not report unmet needs.

## 2023-10-13 NOTE — PLAN OF CARE
Problem: Ineffective Coping  Goal: Identifies ineffective coping skills  Outcome: Progressing  Goal: Identifies healthy coping skills  Outcome: Progressing  Goal: Demonstrates healthy coping skills  Outcome: Progressing  Goal: Participates in unit activities  Description: Interventions:  - Provide therapeutic environment   - Provide required programming   - Redirect inappropriate behaviors   Outcome: Progressing     Problem: Depression  Goal: Attend and participate in unit activities, including therapeutic, recreational, and educational groups  Description: Interventions:  - Provide therapeutic and educational activities daily, encourage attendance and participation, and document same in the medical record   Outcome: Progressing

## 2023-10-13 NOTE — NURSING NOTE
Patient remains compliant with medications and unit routine. She reports feeling anxious and depressed. Denies SI, HI, AVH. Pt reports some improvement in mood. She endorses fair appetite and adequate sleep. Quiet, mainly keeps to herself. Pt does not report unmet needs. No acute distress noted.

## 2023-10-13 NOTE — NURSING NOTE
Pt requested and received Atarax 50 mg for anxiety H: 21 r/t peer behaviors on unit. Upon follow up pt is laying in bed and states medication was helpful. She denies any current SI/HI/AVH but continues with fatigue and depression. She takes frequent naps throughout the day and declines to attend group. Pt reports poor appetite and low motivation. She states she "doesn't really" feel she's improved since arriving to IP.

## 2023-10-13 NOTE — PLAN OF CARE
Problem: Ineffective Coping  Goal: Identifies ineffective coping skills  Outcome: Progressing  Goal: Identifies healthy coping skills  Outcome: Progressing  Goal: Demonstrates healthy coping skills  Outcome: Progressing  Goal: Understands least restrictive measures  Description: Interventions:  - Utilize least restrictive behavior  Outcome: Progressing  Goal: Free from restraint events  Description: - Utilize least restrictive measures   - Provide behavioral interventions   - Redirect inappropriate behaviors   Outcome: Progressing     Problem: Depression  Goal: Treatment Goal: Demonstrate behavioral control of depressive symptoms, verbalize feelings of improved mood/affect, and adopt new coping skills prior to discharge  Outcome: Progressing  Goal: Verbalize thoughts and feelings  Description: Interventions:  - Assess and re-assess patient's level of risk   - Engage patient in 1:1 interactions, daily, for a minimum of 15 minutes   - Encourage patient to express feelings, fears, frustrations, hopes   Outcome: Progressing  Goal: Refrain from harming self  Description: Interventions:  - Monitor patient closely, per order   - Supervise medication ingestion, monitor effects and side effects   Outcome: Progressing  Goal: Refrain from isolation  Description: Interventions:  - Develop a trusting relationship   - Encourage socialization   Outcome: Progressing  Goal: Refrain from self-neglect  Outcome: Progressing  Goal: Complete daily ADLs, including personal hygiene independently, as able  Description: Interventions:  - Observe, teach, and assist patient with ADLS  -  Monitor and promote a balance of rest/activity, with adequate nutrition and elimination   Outcome: Progressing     Problem: Anxiety  Goal: Anxiety is at manageable level  Description: Interventions:  - Assess and monitor patient's anxiety level.    - Monitor for signs and symptoms (heart palpitations, chest pain, shortness of breath, headaches, nausea, feeling jumpy, restlessness, irritable, apprehensive). - Collaborate with interdisciplinary team and initiate plan and interventions as ordered.   - [place] patient to unit/surroundings  - Explain treatment plan  - Encourage participation in care  - Encourage verbalization of concerns/fears  - Identify coping mechanisms  - Assist in developing anxiety-reducing skills  - Administer/offer alternative therapies  - Limit or eliminate stimulants  Outcome: Progressing

## 2023-10-13 NOTE — PLAN OF CARE
Problem: Ineffective Coping  Goal: Identifies ineffective coping skills  Outcome: Progressing  Goal: Identifies healthy coping skills  Outcome: Progressing  Goal: Demonstrates healthy coping skills  Outcome: Progressing  Goal: Understands least restrictive measures  Description: Interventions:  - Utilize least restrictive behavior  Outcome: Progressing  Goal: Free from restraint events  Description: - Utilize least restrictive measures   - Provide behavioral interventions   - Redirect inappropriate behaviors   Outcome: Progressing     Problem: Depression  Goal: Treatment Goal: Demonstrate behavioral control of depressive symptoms, verbalize feelings of improved mood/affect, and adopt new coping skills prior to discharge  Outcome: Progressing  Goal: Verbalize thoughts and feelings  Description: Interventions:  - Assess and re-assess patient's level of risk   - Engage patient in 1:1 interactions, daily, for a minimum of 15 minutes   - Encourage patient to express feelings, fears, frustrations, hopes   Outcome: Progressing  Goal: Refrain from harming self  Description: Interventions:  - Monitor patient closely, per order   - Supervise medication ingestion, monitor effects and side effects   Outcome: Progressing  Goal: Refrain from isolation  Description: Interventions:  - Develop a trusting relationship   - Encourage socialization   Outcome: Progressing  Goal: Refrain from self-neglect  Outcome: Progressing  Goal: Complete daily ADLs, including personal hygiene independently, as able  Description: Interventions:  - Observe, teach, and assist patient with ADLS  -  Monitor and promote a balance of rest/activity, with adequate nutrition and elimination   Outcome: Progressing     Problem: Anxiety  Goal: Anxiety is at manageable level  Description: Interventions:  - Assess and monitor patient's anxiety level.    - Monitor for signs and symptoms (heart palpitations, chest pain, shortness of breath, headaches, nausea, feeling jumpy, restlessness, irritable, apprehensive). - Collaborate with interdisciplinary team and initiate plan and interventions as ordered.   - [place] patient to unit/surroundings  - Explain treatment plan  - Encourage participation in care  - Encourage verbalization of concerns/fears  - Identify coping mechanisms  - Assist in developing anxiety-reducing skills  - Administer/offer alternative therapies  - Limit or eliminate stimulants  Outcome: Progressing

## 2023-10-13 NOTE — PROGRESS NOTES
Progress Note - Behavioral Health     Jodi Williamson 44 y.o. female MRN: [id number]   Unit/Bed#: U 212-01 Encounter: [id number]    Behavior over the last 24 hours: unchanged. Yuki Klein is a 45 y/o F with PMH of bipolar depression who presents for psychiatric follow-up. Pt reports minimal improvement in depression and anxiety since yesterday. She notes that she slept well last night and has been sleeping better since the switch to Ambien. Pt has noticed some improvement in energy and was partaking in groups yesterday. Notes her appetite is about the same as yesterday, had lunch and dinner. Denies SI/HI. Denies AH or VH. Pt reports since starting the Lamictal she has been experiencing urinary symptoms, including burning with urination, hesitancy, and urgency. Denies hematuria. Denies constipation or diarrhea. She has had normal bowel movements. She reports that she had similar symptoms when she was on Lamictal in the past. She notes that in the past, she started experiencing the symptoms within a few days of starting the Lamictal and reports they remained throughout the time she was on the medication, up until it was discontinued.      Sleep: improved, slept better  Appetite: poor  Medication side effects: No   ROS: all other systems are negative    Mental Status Evaluation:    Appearance:  age appropriate, wearing pajamas, disheveled   Behavior:  calm, psychomotor retardation (improving)   Speech:  slow, soft, delayed (improving)   Mood:  depressed, anxious   Affect:  constricted, mood-congruent   Thought Process:  organized, logical, linear   Associations: intact associations   Thought Content:  no overt delusions   Perceptual Disturbances: no auditory hallucinations, no visual hallucinations   Risk Potential: Suicidal ideation - None  Homicidal ideation - None  Potential for aggression - No   Sensorium:  oriented to person, place, and time/date   Memory:  recent and remote memory grossly intact   Consciousness: alert and awake   Attention/Concentration: attention span and concentration are age appropriate   Insight:  fair   Judgment: fair   Gait/Station: normal gait/station, in bed   Motor Activity: no abnormal movements     Vital signs in last 24 hours:    Temp:  [98.6 °F (37 °C)-99.6 °F (37.6 °C)] 98.6 °F (37 °C)  HR:  [72-79] 72  Resp:  [16-17] 17  BP: (111-115)/(79-89) 111/79    Laboratory results: I have personally reviewed all pertinent laboratory/tests results    Results from the past 24 hours: No results found for this or any previous visit (from the past 24 hour(s)). Most Recent Labs:   Lab Results   Component Value Date    WBC 5.07 09/29/2023    RBC 4.82 09/29/2023    HGB 14.0 09/29/2023    HCT 43.9 09/29/2023     09/29/2023    RDW 14.7 09/29/2023    NEUTROABS 3.00 09/29/2023    SODIUM 136 09/29/2023    K 4.0 09/29/2023     09/29/2023    CO2 24 09/29/2023    BUN 15 09/29/2023    CREATININE 0.94 09/29/2023    GLUC 93 09/29/2023    CALCIUM 9.8 09/29/2023    AST 15 09/29/2023    ALT 9 09/29/2023    ALKPHOS 72 09/29/2023    TP 7.2 09/29/2023    ALB 3.9 09/29/2023    TBILI 0.78 09/29/2023    CHOLESTEROL 234 (H) 09/29/2023    HDL 68 09/29/2023    TRIG 70 09/29/2023    LDLCALC 152 (H) 09/29/2023    [address] 166 09/29/2023    [id number] 1.508 09/29/2023       Progress Toward Goals: progressing    Assessment/Plan   Principal Problem:    Bipolar disorder ([address])  Active Problems:    Hypertension    Hypercholesteremia    BV (bacterial vaginosis)    Medical clearance for psychiatric admission    Generalized anxiety disorder with panic attacks      Recommended Treatment:     Planned medication and treatment changes: All current active medications have been reviewed  Encourage group therapy, milieu therapy and occupational therapy  Behavioral Health checks every 7 minutes    Discontinued Lamictal due to subjective urinary symptoms.  Discussed possibilities of increasing Pristiq, Vraylar, or gabapentin vs trying Depakote or lithium as adjunctive tx for bipolar depression. Patient is in agreement of starting Depakote. Start Depakote  mg daily at bedtime. She is on birth control and does not have plans for future children. Discussed side effects and plans for monitoring levels- will check VPA level, CBC and CMP Monday evening. Continue all other medications.     Current Facility-Administered Medications   Medication Dose Route Frequency Provider Last Rate    acetaminophen  650 mg Oral Q6H PRN Aminah Mendez MD      acetaminophen  650 mg Oral Q4H PRN Aminah Mendez MD      acetaminophen  975 mg Oral Q6H PRN Aminah Mendez MD      aluminum-magnesium hydroxide-simethicone  30 mL Oral Q4H PRN Aminah Mendez MD      haloperidol lactate  2.5 mg Intramuscular Q4H PRN Max 4/day Aminah Mendez MD      And    LORazepam  1 mg Intramuscular Q4H PRN Max 4/day Aminah Mendez MD      And    benztropine  0.5 mg Intramuscular Q4H PRN Max 4/day Aminah Mendez MD      haloperidol lactate  5 mg Intramuscular Q4H PRN Max 4/day Aminah Mendez MD      And    LORazepam  2 mg Intramuscular Q4H PRN Max 4/day Aminah Mendez MD      And    benztropine  1 mg Intramuscular Q4H PRN Max 4/day Aminah Mendez MD      benztropine  1 mg Oral Q4H PRN Max 6/day Aminah Mendez MD      bisacodyl  10 mg Rectal Daily PRN Aminah Mendez MD      cariprazine  3 mg Oral Daily Natalie Bernal MD      desvenlafaxine succinate  50 mg Oral Daily Selin Rosas MD      divalproex sodium  500 mg Oral HS Simeon Quiles, PA-C      gabapentin  300 mg Oral TID Simeon Quiles, PA-C      haloperidol  1 mg Oral Q6H PRN Aminah Mendez MD      haloperidol  2.5 mg Oral Q4H PRN Max 4/day Aminah Mendez MD      haloperidol  5 mg Oral Q4H PRN Max 4/day Aminah Mendez MD      hydrOXYzine HCL  50 mg Oral Q6H PRN Simeon Quiles, PA-C      lisinopril  10 mg Oral Daily Aminah Mendez MD      LORazepam  1 mg Intramuscular Q8H PRN Sloane Araujo PA-C      LORazepam  1 mg Oral Q8H PRN Sloane Araujo PA-C      polyethylene glycol  17 g Oral Daily PRN Felipa Dunaway MD      propranolol  60 mg Oral Daily Alonso Rowe MD      senna-docusate sodium  1 tablet Oral Daily PRN Felipa Dunaway MD      traZODone  50 mg Oral HS PRN Felipa Dunaway MD      zolpidem  5 mg Oral HS Sloane Araujo PA-C       Risks / Benefits of Treatment:    Risks, benefits, and possible side effects of medications explained to patient and patient verbalizes understanding and agreement for treatment. Counseling / Coordination of Care:    Patient's progress discussed with staff in treatment team meeting. Medications, treatment progress and treatment plan reviewed with patient.     Sloane Araujo PA-C 10/13/23

## 2023-10-14 PROCEDURE — 99232 SBSQ HOSP IP/OBS MODERATE 35: CPT | Performed by: PSYCHIATRY & NEUROLOGY

## 2023-10-14 RX ADMIN — GABAPENTIN 300 MG: 100 CAPSULE ORAL at 21:15

## 2023-10-14 RX ADMIN — ZOLPIDEM TARTRATE 5 MG: 5 TABLET ORAL at 21:15

## 2023-10-14 RX ADMIN — GABAPENTIN 300 MG: 100 CAPSULE ORAL at 15:58

## 2023-10-14 RX ADMIN — CARIPRAZINE 3 MG: 3 CAPSULE, GELATIN COATED ORAL at 08:19

## 2023-10-14 RX ADMIN — DIVALPROEX SODIUM 500 MG: 500 TABLET, EXTENDED RELEASE ORAL at 21:15

## 2023-10-14 RX ADMIN — DESVENLAFAXINE 50 MG: 50 TABLET, FILM COATED, EXTENDED RELEASE ORAL at 08:18

## 2023-10-14 RX ADMIN — HYDROXYZINE HYDROCHLORIDE 50 MG: 50 TABLET, FILM COATED ORAL at 08:18

## 2023-10-14 RX ADMIN — LISINOPRIL 10 MG: 10 TABLET ORAL at 08:19

## 2023-10-14 RX ADMIN — LORAZEPAM 1 MG: 1 TABLET ORAL at 14:56

## 2023-10-14 RX ADMIN — GABAPENTIN 300 MG: 100 CAPSULE ORAL at 08:19

## 2023-10-14 RX ADMIN — PROPRANOLOL HYDROCHLORIDE 60 MG: 60 CAPSULE, EXTENDED RELEASE ORAL at 08:21

## 2023-10-14 NOTE — NURSING NOTE
Pt requested and received atarax 50 mg for anxiety H: 18 Upon follow up pt is resting in bed eyes closed respirations even and unlabored. Medication is seemingly effective. Pt denies SI/HI/AVH and is withdrawn to room. She is compliant with meds and uses PRN medications appropriately. Pt refused breakfast tray. Pt had no questions or concerns during interaction.

## 2023-10-14 NOTE — NURSING NOTE
Pt was pleasant and calm during interaction. Patient denies SI/HI/AVH. Patient reports feeling a little depressed and anxious. Patient is hopeful about the med change working for her. Patient compliant with meds and meals. Patient naps throughout the day and is withdrawn to room. Writer encouraged patient to approach staff with questions or concerns. Patient acknowledged.

## 2023-10-14 NOTE — PROGRESS NOTES
Progress Note - Behavioral Health   Lowell Mouse 44 y.o. female MRN: [id number]  Unit/Bed#: -01 Encounter: [id number]    Assessment/Plan   Principal Problem:    Bipolar disorder ([address])  Active Problems:    Hypertension    Hypercholesteremia    BV (bacterial vaginosis)    Medical clearance for psychiatric admission    Generalized anxiety disorder with panic attacks    Recommended Treatment:   Continue current psychotropic medication regimen -no changes at this time  Continue with group therapy, milieu therapy and occupational therapy. Continue frequent safety checks and vitals per unit protocol. Case discussed with treatment team.  Risks, benefits and possible side effects of Medications: Risks, benefits, and possible side effects of medications have been explained to the patient, who verbalizes understanding.      ------------------------------------------------------------  Chief Complaint: "I still have some anxiety"    Interval History: Per staff, patient has not attended groups, demonstrates flat affect. .  Patient reports that she has taken 1 dose of Depakote and denies any noticeable side effects. She does feel like Ambien has helped with sleep but she wakes up too early; she asked about potential increase in Ambien dose. She reports that side effects from Lamictal have stopped. She reports that her current mood is "down," which she does feel better than when she was first admitted. She was encouraged to attend group therapy and spend more time out of bed and out of her room. Medication compliant. Adverse effects of medications: Denies    Progress Toward Goals: Salud Oleary reports improvement of mood since admission but still endorses anxiety. She also reports that Ambien has been helpful but does not keep her asleep for the entire night.   Depakote level will be checked on Monday    Psychiatric Review of Systems:  Behavior over the last 24 hours: improved  Sleep: improved  Appetite: good  Medication side effects: none verbalized  ROS: Complete review of systems is negative except as noted above.     Vital signs in last 24 hours:  Temp:  [98.4 °F (36.9 °C)-99.3 °F (37.4 °C)] 99.3 °F (37.4 °C)  HR:  [69-80] 80  Resp:  [16-18] 16  BP: (111-116)/(78-80) 111/78    Mental Status Evaluation:  Appearance:  alert, good eye contact, appears stated age, and marginal grooming/hygiene   Behavior:  calm, cooperative, and laying in bed   Attitude:  cooperative   Speech:  spontaneous, soft, scant, and coherent   Mood:  "Down"   Affect:  depressed   Thought Process:  Organized, logical, goal-directed   Thought Content: no verbalized delusions or overt paranoia   Perceptual disturbances: no reported hallucinations and does not appear to be responding to internal stimuli at this time   Risk Potential: No active or passive suicidal or homicidal ideation was verbalized during interview   Cognition: oriented to self and situation, appears to be of average intelligence, and cognition not formally tested   Insight:  Fair   Judgment: Fair     Current Medications:  Current Facility-Administered Medications   Medication Dose Route Frequency Provider Last Rate    acetaminophen  650 mg Oral Q6H PRN Alba López MD      acetaminophen  650 mg Oral Q4H PRN Alba López MD      acetaminophen  975 mg Oral Q6H PRN Alba López MD      aluminum-magnesium hydroxide-simethicone  30 mL Oral Q4H PRN Alba López MD      haloperidol lactate  2.5 mg Intramuscular Q4H PRN Max 4/day Alba López MD      And    LORazepam  1 mg Intramuscular Q4H PRN Max 4/day Alba López MD      And    benztropine  0.5 mg Intramuscular Q4H PRN Max 4/day Alba López MD      haloperidol lactate  5 mg Intramuscular Q4H PRN Max 4/day Alba López MD      And    LORazepam  2 mg Intramuscular Q4H PRN Max 4/day Alba López MD      And    benztropine  1 mg Intramuscular Q4H PRN Max 4/day Alba López MD benztropine  1 mg Oral Q4H PRN Max 6/day Thanh Coelho MD      bisacodyl  10 mg Rectal Daily PRN Thanh Coelho MD      cariprazine  3 mg Oral Daily Frederick Brewster MD      desvenlafaxine succinate  50 mg Oral Daily Hawa Antunez, MD      divalproex sodium  500 mg Oral HS Abbey Helling, PA-C      gabapentin  300 mg Oral TID Abbey Helmayco, PA-C      haloperidol  1 mg Oral Q6H PRN Thanh Coelho MD      haloperidol  2.5 mg Oral Q4H PRN Max 4/day Thanh Coelho MD      haloperidol  5 mg Oral Q4H PRN Max 4/day Thanh Coelho MD      hydrOXYzine HCL  50 mg Oral Q6H PRN Abbey Helling, PA-C      lisinopril  10 mg Oral Daily Thanh Coelho MD      LORazepam  1 mg Intramuscular Q8H PRN Abbey Helling, PA-C      LORazepam  1 mg Oral Q8H PRN Abbey Helling, PA-C      polyethylene glycol  17 g Oral Daily PRN Thanh Coelho MD      propranolol  60 mg Oral Daily Hawa Antunez, MD      senna-docusate sodium  1 tablet Oral Daily PRN Thanh Coelho MD      traZODone  50 mg Oral HS PRN Thanh Coelho MD      zolpidem  5 mg Oral HS Abbey Penelope, JADE         Behavioral Health Medications: all current active meds have been reviewed. Changes as in plan section above. Laboratory results:  I have personally reviewed all pertinent laboratory/tests results. No results found for this or any previous visit (from the past 48 hour(s)). This note has been constructed using a voice recognition system. There may be translation, syntax, or grammatical errors. If you have any questions, please contact the dictating provider.

## 2023-10-14 NOTE — NURSING NOTE
Patient requested and received Ativan 1 mg PO for anxiety at 1456. Carmen score 22. Upon follow up patient stated she felt less anxious. PRN was effective.

## 2023-10-14 NOTE — PLAN OF CARE
Problem: Ineffective Coping  Goal: Identifies ineffective coping skills  Outcome: Progressing  Goal: Identifies healthy coping skills  Outcome: Progressing  Goal: Demonstrates healthy coping skills  Outcome: Progressing  Goal: Patient/Family participate in treatment and DC plans  Description: Interventions:  - Provide therapeutic environment  Outcome: Progressing  Goal: Patient/Family verbalizes awareness of resources  Outcome: Progressing  Goal: Understands least restrictive measures  Description: Interventions:  - Utilize least restrictive behavior  Outcome: Progressing  Goal: Free from restraint events  Description: - Utilize least restrictive measures   - Provide behavioral interventions   - Redirect inappropriate behaviors   Outcome: Progressing     Problem: Depression  Goal: Verbalize thoughts and feelings  Description: Interventions:  - Assess and re-assess patient's level of risk   - Engage patient in 1:1 interactions, daily, for a minimum of 15 minutes   - Encourage patient to express feelings, fears, frustrations, hopes   Outcome: Progressing  Goal: Refrain from harming self  Description: Interventions:  - Monitor patient closely, per order   - Supervise medication ingestion, monitor effects and side effects   Outcome: Progressing  Goal: Refrain from self-neglect  Outcome: Progressing  Goal: Complete daily ADLs, including personal hygiene independently, as able  Description: Interventions:  - Observe, teach, and assist patient with ADLS  -  Monitor and promote a balance of rest/activity, with adequate nutrition and elimination   Outcome: Progressing

## 2023-10-15 PROCEDURE — 99232 SBSQ HOSP IP/OBS MODERATE 35: CPT | Performed by: PSYCHIATRY & NEUROLOGY

## 2023-10-15 RX ORDER — DIVALPROEX SODIUM 500 MG/1
500 TABLET, EXTENDED RELEASE ORAL
Status: DISCONTINUED | OUTPATIENT
Start: 2023-10-15 | End: 2023-10-18 | Stop reason: HOSPADM

## 2023-10-15 RX ORDER — ZOLPIDEM TARTRATE 5 MG/1
5 TABLET ORAL
Status: DISCONTINUED | OUTPATIENT
Start: 2023-10-15 | End: 2023-10-18 | Stop reason: HOSPADM

## 2023-10-15 RX ORDER — GABAPENTIN 300 MG/1
300 CAPSULE ORAL 3 TIMES DAILY
Status: DISCONTINUED | OUTPATIENT
Start: 2023-10-15 | End: 2023-10-18 | Stop reason: HOSPADM

## 2023-10-15 RX ADMIN — DIVALPROEX SODIUM 500 MG: 500 TABLET, EXTENDED RELEASE ORAL at 20:48

## 2023-10-15 RX ADMIN — HYDROXYZINE HYDROCHLORIDE 50 MG: 50 TABLET, FILM COATED ORAL at 14:06

## 2023-10-15 RX ADMIN — ZOLPIDEM TARTRATE 5 MG: 5 TABLET ORAL at 20:48

## 2023-10-15 RX ADMIN — LORAZEPAM 1 MG: 1 TABLET ORAL at 15:45

## 2023-10-15 RX ADMIN — GABAPENTIN 300 MG: 100 CAPSULE ORAL at 08:30

## 2023-10-15 RX ADMIN — CARIPRAZINE 3 MG: 3 CAPSULE, GELATIN COATED ORAL at 08:34

## 2023-10-15 RX ADMIN — GABAPENTIN 300 MG: 300 CAPSULE ORAL at 15:44

## 2023-10-15 RX ADMIN — DESVENLAFAXINE 50 MG: 50 TABLET, FILM COATED, EXTENDED RELEASE ORAL at 08:31

## 2023-10-15 RX ADMIN — GABAPENTIN 300 MG: 300 CAPSULE ORAL at 20:48

## 2023-10-15 RX ADMIN — PROPRANOLOL HYDROCHLORIDE 60 MG: 60 CAPSULE, EXTENDED RELEASE ORAL at 08:30

## 2023-10-15 NOTE — PLAN OF CARE
Problem: Ineffective Coping  Goal: Free from restraint events  Description: - Utilize least restrictive measures   - Provide behavioral interventions   - Redirect inappropriate behaviors   Outcome: Progressing     Problem: Depression  Goal: Verbalize thoughts and feelings  Description: Interventions:  - Assess and re-assess patient's level of risk   - Engage patient in 1:1 interactions, daily, for a minimum of 15 minutes   - Encourage patient to express feelings, fears, frustrations, hopes   Outcome: Progressing  Goal: Refrain from harming self  Description: Interventions:  - Monitor patient closely, per order   - Supervise medication ingestion, monitor effects and side effects   Outcome: Progressing

## 2023-10-15 NOTE — PROGRESS NOTES
Progress Note - Behavioral Health   Tab Drake 44 y.o. female MRN: [id number]  Unit/Bed#: -01 Encounter: [id number]    Assessment/Plan   Principal Problem:    Bipolar disorder ([address])  Active Problems:    Hypertension    Hypercholesteremia    BV (bacterial vaginosis)    Medical clearance for psychiatric admission    Generalized anxiety disorder with panic attacks    Recommended Treatment:   Continue current psychotropic medication regimen, no changes at this time  Continue with group therapy, milieu therapy and occupational therapy. Continue frequent safety checks and vitals per unit protocol. Case discussed with treatment team.  Risks, benefits and possible side effects of Medications: Risks, benefits, and possible side effects of medications have been explained to the patient, who verbalizes understanding.      ------------------------------------------------------------  Chief Complaint:  " I feel like I am coming out of the depression a little bit"    Interval History: Per staff, patient spends most of the day napping, reporting mild depression and anxiety throughout the day. Received as needed Atarax and Ativan yesterday. Patient reports that her main struggle at this time is anxiety, she states that at times it comes out of nowhere. In terms of sleep she reports "okay," does state she still is waking up at 6 AM possibly due to noise on the milieu. Charlotte Osuna does report feeling safe on the unit, denies suicidal thoughts, denies paranoia. She was encouraged to attend groups in order to learn effective coping skills. She is aware of blood work draw tomorrow evening. Medication compliant. Adverse effects of medications: Denies    Progress Toward Goals: She is noting some improvement in her depression, she is encouraged that Depakote can improve symptoms of anxiety and mood.     Psychiatric Review of Systems:  Behavior over the last 24 hours: unchanged  Sleep: normal  Appetite: good  Medication side effects: none verbalized  ROS: Complete review of systems is negative except as noted above.     Vital signs in last 24 hours:  Temp:  [99 °F (37.2 °C)-99.4 °F (37.4 °C)] 99 °F (37.2 °C)  HR:  [67-74] 67  Resp:  [16-17] 16  BP: (105-109)/(77-78) 109/77    Mental Status Evaluation:  Appearance:  alert, good eye contact, appears stated age, and casually dressed   Behavior:  calm, cooperative, and laying in bed   Attitude:  cooperative   Speech:  soft, scant, and coherent   Mood:  "Little better"   Affect:  depressed   Thought Process:  Organized, logical, goal-directed   Thought Content: no verbalized delusions or overt paranoia   Perceptual disturbances: no reported hallucinations and does not appear to be responding to internal stimuli at this time   Risk Potential: No active or passive suicidal or homicidal ideation was verbalized during interview   Cognition: oriented to self and situation, appears to be of average intelligence, and cognition not formally tested   Insight:  Fair   Judgment: Fair     Current Medications:  Current Facility-Administered Medications   Medication Dose Route Frequency Provider Last Rate    acetaminophen  650 mg Oral Q6H PRN Felipa Dunaway MD      acetaminophen  650 mg Oral Q4H PRN Felipa Dunaway MD      acetaminophen  975 mg Oral Q6H PRN Felipa Dunaway MD      aluminum-magnesium hydroxide-simethicone  30 mL Oral Q4H PRN Felipa Dunaway MD      haloperidol lactate  2.5 mg Intramuscular Q4H PRN Max 4/day Felipa Dunaway MD      And    LORazepam  1 mg Intramuscular Q4H PRN Max 4/day Felipa Dunaway MD      And    benztropine  0.5 mg Intramuscular Q4H PRN Max 4/day Felipa Dunaway MD      haloperidol lactate  5 mg Intramuscular Q4H PRN Max 4/day Felipa Dunaway MD      And    LORazepam  2 mg Intramuscular Q4H PRN Max 4/day Felipa Dunaway MD      And    benztropine  1 mg Intramuscular Q4H PRN Max 4/day Felipa Dunaway MD      benztropine  1 mg Oral Q4H PRN Max 6/day Jamilah Minaya MD      bisacodyl  10 mg Rectal Daily PRN Jamilah Minaya MD      cariprazine  3 mg Oral Daily Abhijti Broussard MD      desvenlafaxine succinate  50 mg Oral Daily Eh Hare MD      divalproex sodium  500 mg Oral HS [place], PA-C      gabapentin  300 mg Oral TID [place], PA-C      haloperidol  1 mg Oral Q6H PRN Jamilah Minaya MD      haloperidol  2.5 mg Oral Q4H PRN Max 4/day Jamilah Minaya MD      haloperidol  5 mg Oral Q4H PRN Max 4/day Jamilah Minaya MD      hydrOXYzine HCL  50 mg Oral Q6H PRN [place], PA-C      lisinopril  10 mg Oral Daily Jamilah Minaya MD      LORazepam  1 mg Intramuscular Q8H PRN [place], PA-C      LORazepam  1 mg Oral Q8H PRN [place], PA-C      polyethylene glycol  17 g Oral Daily PRN Jamilah Minaya MD      propranolol  60 mg Oral Daily Eh Hare MD      senna-docusate sodium  1 tablet Oral Daily PRN Jamilah Minaya MD      traZODone  50 mg Oral HS PRN Jamilah Minaya MD      zolpidem  5 mg Oral HS [place], PA-C         Behavioral Health Medications: all current active meds have been reviewed. Changes as in plan section above. Laboratory results:  I have personally reviewed all pertinent laboratory/tests results. No results found for this or any previous visit (from the past 48 hour(s)). This note has been constructed using a voice recognition system. There may be translation, syntax, or grammatical errors. If you have any questions, please contact the dictating provider.

## 2023-10-15 NOTE — NURSING NOTE
Pt requested ativan 1 mg for moderate to severe anxiety 7-8/10. Gave ativan 1 mg at 1545 and gutierrez anxiety score was 19. Pt states that atarax was ineffective. An hour later, pt reports that ativan was moderately effective.

## 2023-10-15 NOTE — NURSING NOTE
Pt denies SI/HI/AVH, endorses ongoing depression and anxiety. Pt comes [facility] and makes needs known.

## 2023-10-16 LAB
ALBUMIN SERPL BCP-MCNC: 4 G/DL (ref 3.5–5)
ALP SERPL-CCNC: 66 U/L (ref 34–104)
ALT SERPL W P-5'-P-CCNC: 9 U/L (ref 7–52)
ANION GAP SERPL CALCULATED.3IONS-SCNC: 5 MMOL/L
AST SERPL W P-5'-P-CCNC: 17 U/L (ref 13–39)
BASOPHILS # BLD AUTO: 0.07 THOUSANDS/ÂΜL (ref 0–0.1)
BASOPHILS NFR BLD AUTO: 1 % (ref 0–1)
BILIRUB SERPL-MCNC: 0.41 MG/DL (ref 0.2–1)
BUN SERPL-MCNC: 25 MG/DL (ref 5–25)
CALCIUM SERPL-MCNC: 9.8 MG/DL (ref 8.4–10.2)
CHLORIDE SERPL-SCNC: 102 MMOL/L (ref 96–108)
CO2 SERPL-SCNC: 30 MMOL/L (ref 21–32)
CREAT SERPL-MCNC: 0.99 MG/DL (ref 0.6–1.3)
EOSINOPHIL # BLD AUTO: 0.23 THOUSAND/ÂΜL (ref 0–0.61)
EOSINOPHIL NFR BLD AUTO: 3 % (ref 0–6)
ERYTHROCYTE [DISTWIDTH] IN BLOOD BY AUTOMATED COUNT: 13.8 % (ref 11.6–15.1)
GFR SERPL CREATININE-BSD FRML MDRD: 70 ML/MIN/1.73SQ M
GLUCOSE SERPL-MCNC: 88 MG/DL (ref 65–140)
HCT VFR BLD AUTO: 42.6 % (ref 34.8–46.1)
HGB BLD-MCNC: 13.7 G/DL (ref 11.5–15.4)
IMM GRANULOCYTES # BLD AUTO: 0.01 THOUSAND/UL (ref 0–0.2)
IMM GRANULOCYTES NFR BLD AUTO: 0 % (ref 0–2)
LYMPHOCYTES # BLD AUTO: 2.61 THOUSANDS/ÂΜL (ref 0.6–4.47)
LYMPHOCYTES NFR BLD AUTO: 33 % (ref 14–44)
MCH RBC QN AUTO: 29.5 PG (ref 26.8–34.3)
MCHC RBC AUTO-ENTMCNC: 32.2 G/DL (ref 31.4–37.4)
MCV RBC AUTO: 92 FL (ref 82–98)
MONOCYTES # BLD AUTO: 0.64 THOUSAND/ÂΜL (ref 0.17–1.22)
MONOCYTES NFR BLD AUTO: 8 % (ref 4–12)
NEUTROPHILS # BLD AUTO: 4.29 THOUSANDS/ÂΜL (ref 1.85–7.62)
NEUTS SEG NFR BLD AUTO: 55 % (ref 43–75)
NRBC BLD AUTO-RTO: 0 /100 WBCS
PLATELET # BLD AUTO: 333 THOUSANDS/UL (ref 149–390)
PMV BLD AUTO: 9.2 FL (ref 8.9–12.7)
POTASSIUM SERPL-SCNC: 5 MMOL/L (ref 3.5–5.3)
PROT SERPL-MCNC: 7.2 G/DL (ref 6.4–8.4)
RBC # BLD AUTO: 4.64 MILLION/UL (ref 3.81–5.12)
SODIUM SERPL-SCNC: 137 MMOL/L (ref 135–147)
VALPROATE SERPL-MCNC: 42 UG/ML (ref 50–100)
WBC # BLD AUTO: 7.85 THOUSAND/UL (ref 4.31–10.16)

## 2023-10-16 PROCEDURE — 99232 SBSQ HOSP IP/OBS MODERATE 35: CPT | Performed by: PHYSICIAN ASSISTANT

## 2023-10-16 PROCEDURE — 80053 COMPREHEN METABOLIC PANEL: CPT | Performed by: PHYSICIAN ASSISTANT

## 2023-10-16 PROCEDURE — 80164 ASSAY DIPROPYLACETIC ACD TOT: CPT | Performed by: PHYSICIAN ASSISTANT

## 2023-10-16 PROCEDURE — 85025 COMPLETE CBC W/AUTO DIFF WBC: CPT | Performed by: PHYSICIAN ASSISTANT

## 2023-10-16 RX ADMIN — CARIPRAZINE 3 MG: 3 CAPSULE, GELATIN COATED ORAL at 08:21

## 2023-10-16 RX ADMIN — DESVENLAFAXINE 50 MG: 50 TABLET, FILM COATED, EXTENDED RELEASE ORAL at 08:20

## 2023-10-16 RX ADMIN — GABAPENTIN 300 MG: 300 CAPSULE ORAL at 16:15

## 2023-10-16 RX ADMIN — ZOLPIDEM TARTRATE 5 MG: 5 TABLET ORAL at 21:04

## 2023-10-16 RX ADMIN — GABAPENTIN 300 MG: 300 CAPSULE ORAL at 21:04

## 2023-10-16 RX ADMIN — GABAPENTIN 300 MG: 300 CAPSULE ORAL at 08:21

## 2023-10-16 RX ADMIN — LISINOPRIL 10 MG: 10 TABLET ORAL at 08:20

## 2023-10-16 RX ADMIN — PROPRANOLOL HYDROCHLORIDE 60 MG: 60 CAPSULE, EXTENDED RELEASE ORAL at 08:21

## 2023-10-16 RX ADMIN — DIVALPROEX SODIUM 500 MG: 500 TABLET, EXTENDED RELEASE ORAL at 21:03

## 2023-10-16 NOTE — PROGRESS NOTES
Progress Note - Behavioral Health     Anish Sheppard 44 y.o. female MRN: [id number]   Unit/Bed#: -01 Encounter: [id number]    Behavior over the last 24 hours: unchanged. Ronal Aaron is a 43 y/o F with PMH of bipolar depression presenting for psychiatric follow-up. No overnight events. Patient reports her mood is unchanged and she remains mostly withdrawn to her bed with limited participation in inpatient programming. Still feels anxious and depressed. Notes she experienced increased anxiety over the weekend. She is still sleeping well. Reports slight improvement in her energy level. Has been reading her book without difficulty concentrating. Her appetite is still fair, eating about 2 meals per day. Denies HI/SI. Denies AH/VH. States she has been experiencing constipation with her last bowel movement 2 days ago, which is abnormal for her. Denies abdominal pain, vomiting, or nausea.  Reports resolution of her urinary symptoms since discontinuing the Lamictal.     Sleep: slept better  Appetite: fair  Medication side effects: No   ROS: all other systems are negative    Mental Status Evaluation:    Appearance:  age appropriate, wearing pajamas, disheveled   Behavior:  calm, psychomotor retardation   Speech:  slow, delayed   Mood:  depressed, anxious   Affect:  constricted, mood-congruent   Thought Process:  organized, logical, linear   Associations: intact associations   Thought Content:  no overt delusions   Perceptual Disturbances: no auditory hallucinations, no visual hallucinations   Risk Potential: Suicidal ideation - None  Homicidal ideation - None  Potential for aggression - No   Sensorium:  oriented to person, place, and time/date   Memory:  recent and remote memory grossly intact   Consciousness:  alert and awake   Attention/Concentration: attention span and concentration appear shorter than expected for age   Insight:  fair   Judgment: fair   Gait/Station: normal gait/station, in bed   Motor Activity: no abnormal movements     Vital signs in last 24 hours:    Temp:  [97.4 °F (36.3 °C)-98.7 °F (37.1 °C)] 97.4 °F (36.3 °C)  HR:  [84-85] 84  Resp:  [16-18] 18  BP: (129-132)/(88-91) 129/88    Laboratory results: I have personally reviewed all pertinent laboratory/tests results    Results from the past 24 hours: No results found for this or any previous visit (from the past 24 hour(s)). Most Recent Labs:   Lab Results   Component Value Date    WBC 5.07 09/29/2023    RBC 4.82 09/29/2023    HGB 14.0 09/29/2023    HCT 43.9 09/29/2023     09/29/2023    RDW 14.7 09/29/2023    NEUTROABS 3.00 09/29/2023    SODIUM 136 09/29/2023    K 4.0 09/29/2023     09/29/2023    CO2 24 09/29/2023    BUN 15 09/29/2023    CREATININE 0.94 09/29/2023    GLUC 93 09/29/2023    CALCIUM 9.8 09/29/2023    AST 15 09/29/2023    ALT 9 09/29/2023    ALKPHOS 72 09/29/2023    TP 7.2 09/29/2023    ALB 3.9 09/29/2023    TBILI 0.78 09/29/2023    CHOLESTEROL 234 (H) 09/29/2023    HDL 68 09/29/2023    TRIG 70 09/29/2023    LDLCALC 152 (H) 09/29/2023    [address] [employer] [facility] 166 09/29/2023    [id number] 1.508 09/29/2023       Progress Toward Goals: progressing    Assessment/Plan   Principal Problem:    Bipolar disorder ([address])  Active Problems:    Hypertension    Hypercholesteremia    BV (bacterial vaginosis)    Medical clearance for psychiatric admission    Generalized anxiety disorder with panic attacks      Recommended Treatment:     Planned medication and treatment changes: All current active medications have been reviewed  Encourage group therapy, milieu therapy and occupational therapy  Behavioral Health checks every 7 minutes    Will check VPA level this evening, titrate accordingly. Patient is in agreement of increasing the dose if level is not therapeutic. Continue all other medications.      Current Facility-Administered Medications   Medication Dose Route Frequency Provider Last Rate    acetaminophen  650 mg Oral Q6H PRN Gabriel Rasheed MD acetaminophen  650 mg Oral Q4H PRN Ladora Olszewski, MD      acetaminophen  975 mg Oral Q6H PRN Ladora Olszewski, MD      aluminum-magnesium hydroxide-simethicone  30 mL Oral Q4H PRN Ladora Olszewski, MD      haloperidol lactate  2.5 mg Intramuscular Q4H PRN Max 4/day Ladora Olszewski, MD      And    LORazepam  1 mg Intramuscular Q4H PRN Max 4/day Ladora Olszewski, MD      And    benztropine  0.5 mg Intramuscular Q4H PRN Max 4/day Ladora Olszewski, MD      haloperidol lactate  5 mg Intramuscular Q4H PRN Max 4/day Ladora Olszewski, MD      And    LORazepam  2 mg Intramuscular Q4H PRN Max 4/day Ladora Olszewski, MD      And    benztropine  1 mg Intramuscular Q4H PRN Max 4/day Ladora Olszewski, MD      benztropine  1 mg Oral Q4H PRN Max 6/day Ladora Olszewski, MD      bisacodyl  10 mg Rectal Daily PRN Ladora Olszewski, MD      cariprazine  3 mg Oral Daily Rocio Richardson MD      desvenlafaxine succinate  50 mg Oral Daily Deanna García MD      divalproex sodium  500 mg Oral HS [facility] , DO      gabapentin  300 mg Oral TID [facility] , DO      haloperidol  1 mg Oral Q6H PRN Ladora Olszewski, MD      haloperidol  2.5 mg Oral Q4H PRN Max 4/day Ladora Olszewski, MD      haloperidol  5 mg Oral Q4H PRN Max 4/day Ladora Olszewski, MD      hydrOXYzine HCL  50 mg Oral Q6H PRN Raquel Pel, PA-C      lisinopril  10 mg Oral Daily Ladora Olszewski, MD      LORazepam  1 mg Intramuscular Q8H PRN Raquel Pel, PA-C      LORazepam  1 mg Oral Q8H PRN Raquel Pel, PA-C      polyethylene glycol  17 g Oral Daily PRN Ladora Olszewski, MD      propranolol  60 mg Oral Daily Deanna García MD      senna-docusate sodium  1 tablet Oral Daily PRN Ladora Olszewski, MD      traZODone  50 mg Oral HS PRN Ladora Olszewski, MD      zolpidem  5 mg Oral  Taina Weiner DO       Risks / Benefits of Treatment:    Risks, benefits, and possible side effects of medications explained to patient and patient verbalizes understanding and agreement for treatment. Counseling / Coordination of Care:    Patient's progress discussed with staff in treatment team meeting. Medications, treatment progress and treatment plan reviewed with patient.     Robson Jones PA-C 10/16/23

## 2023-10-16 NOTE — NURSING NOTE
Pt showered and washed clothes this evening. Walking the halls with female peers. Appears brighter and smiling during interaction. Denies SI/HI or hallucination. Anxiety mild/moderate at times. Able to avoid using PRNs so far.

## 2023-10-16 NOTE — PLAN OF CARE
Problem: Ineffective Coping  Goal: Identifies ineffective coping skills  Outcome: Progressing  Goal: Identifies healthy coping skills  Outcome: Progressing  Goal: Demonstrates healthy coping skills  Outcome: Progressing  Goal: Patient/Family participate in treatment and DC plans  Description: Interventions:  - Provide therapeutic environment  Outcome: Progressing  Goal: Patient/Family verbalizes awareness of resources  Outcome: Progressing  Goal: Understands least restrictive measures  Description: Interventions:  - Utilize least restrictive behavior  Outcome: Progressing  Goal: Free from restraint events  Description: - Utilize least restrictive measures   - Provide behavioral interventions   - Redirect inappropriate behaviors   Outcome: Progressing     Problem: Depression  Goal: Treatment Goal: Demonstrate behavioral control of depressive symptoms, verbalize feelings of improved mood/affect, and adopt new coping skills prior to discharge  Outcome: Progressing  Goal: Verbalize thoughts and feelings  Description: Interventions:  - Assess and re-assess patient's level of risk   - Engage patient in 1:1 interactions, daily, for a minimum of 15 minutes   - Encourage patient to express feelings, fears, frustrations, hopes   Outcome: Progressing  Goal: Refrain from harming self  Description: Interventions:  - Monitor patient closely, per order   - Supervise medication ingestion, monitor effects and side effects   Outcome: Progressing  Goal: Refrain from isolation  Description: Interventions:  - Develop a trusting relationship   - Encourage socialization   Outcome: Progressing  Goal: Refrain from self-neglect  Outcome: Progressing  Goal: Attend and participate in unit activities, including therapeutic, recreational, and educational groups  Description: Interventions:  - Provide therapeutic and educational activities daily, encourage attendance and participation, and document same in the medical record   Outcome: Progressing  Goal: Complete daily ADLs, including personal hygiene independently, as able  Description: Interventions:  - Observe, teach, and assist patient with ADLS  -  Monitor and promote a balance of rest/activity, with adequate nutrition and elimination   Outcome: Progressing     Problem: Anxiety  Goal: Anxiety is at manageable level  Description: Interventions:  - Assess and monitor patient's anxiety level. - Monitor for signs and symptoms (heart palpitations, chest pain, shortness of breath, headaches, nausea, feeling jumpy, restlessness, irritable, apprehensive). - Collaborate with interdisciplinary team and initiate plan and interventions as ordered.   - [place] patient to unit/surroundings  - Explain treatment plan  - Encourage participation in care  - Encourage verbalization of concerns/fears  - Identify coping mechanisms  - Assist in developing anxiety-reducing skills  - Administer/offer alternative therapies  - Limit or eliminate stimulants  Outcome: Progressing

## 2023-10-16 NOTE — NURSING NOTE
Pt withdrawn to room, reading in bed. Denies SI/HI or hallucination. Not interested in groups. Reports "I'm good". Appears brighter during interaction.

## 2023-10-17 PROBLEM — Z00.8 MEDICAL CLEARANCE FOR PSYCHIATRIC ADMISSION: Status: RESOLVED | Noted: 2023-09-29 | Resolved: 2023-10-17

## 2023-10-17 PROBLEM — B96.89 BV (BACTERIAL VAGINOSIS): Status: RESOLVED | Noted: 2023-09-29 | Resolved: 2023-10-17

## 2023-10-17 PROBLEM — N76.0 BV (BACTERIAL VAGINOSIS): Status: RESOLVED | Noted: 2023-09-29 | Resolved: 2023-10-17

## 2023-10-17 LAB
ANION GAP SERPL CALCULATED.3IONS-SCNC: 7 MMOL/L
BUN SERPL-MCNC: 23 MG/DL (ref 5–25)
CALCIUM SERPL-MCNC: 9.3 MG/DL (ref 8.4–10.2)
CHLORIDE SERPL-SCNC: 106 MMOL/L (ref 96–108)
CO2 SERPL-SCNC: 26 MMOL/L (ref 21–32)
CREAT SERPL-MCNC: 1.38 MG/DL (ref 0.6–1.3)
GFR SERPL CREATININE-BSD FRML MDRD: 47 ML/MIN/1.73SQ M
GLUCOSE P FAST SERPL-MCNC: 86 MG/DL (ref 65–99)
GLUCOSE SERPL-MCNC: 86 MG/DL (ref 65–140)
POTASSIUM SERPL-SCNC: 4.3 MMOL/L (ref 3.5–5.3)
SODIUM SERPL-SCNC: 139 MMOL/L (ref 135–147)

## 2023-10-17 PROCEDURE — 99232 SBSQ HOSP IP/OBS MODERATE 35: CPT | Performed by: PHYSICIAN ASSISTANT

## 2023-10-17 PROCEDURE — 80048 BASIC METABOLIC PNL TOTAL CA: CPT | Performed by: STUDENT IN AN ORGANIZED HEALTH CARE EDUCATION/TRAINING PROGRAM

## 2023-10-17 RX ADMIN — CARIPRAZINE 3 MG: 3 CAPSULE, GELATIN COATED ORAL at 08:42

## 2023-10-17 RX ADMIN — GABAPENTIN 300 MG: 300 CAPSULE ORAL at 16:31

## 2023-10-17 RX ADMIN — ZOLPIDEM TARTRATE 5 MG: 5 TABLET ORAL at 20:34

## 2023-10-17 RX ADMIN — DIVALPROEX SODIUM 500 MG: 500 TABLET, EXTENDED RELEASE ORAL at 20:34

## 2023-10-17 RX ADMIN — GABAPENTIN 300 MG: 300 CAPSULE ORAL at 20:34

## 2023-10-17 RX ADMIN — LORAZEPAM 1 MG: 1 TABLET ORAL at 09:45

## 2023-10-17 RX ADMIN — PROPRANOLOL HYDROCHLORIDE 60 MG: 60 CAPSULE, EXTENDED RELEASE ORAL at 08:42

## 2023-10-17 RX ADMIN — GABAPENTIN 300 MG: 300 CAPSULE ORAL at 08:42

## 2023-10-17 RX ADMIN — DESVENLAFAXINE 50 MG: 50 TABLET, FILM COATED, EXTENDED RELEASE ORAL at 08:42

## 2023-10-17 RX ADMIN — LISINOPRIL 10 MG: 10 TABLET ORAL at 08:42

## 2023-10-17 NOTE — PROGRESS NOTES
Pt reported feeling improved, reading in the day room. No PRNs. DC: TBD    10/17/23 0752   Team Meeting   Meeting Type Daily Rounds   Team Members Present   Team Members Present Physician;Nurse;; Other (Discipline and Name)   Physician Team Member Dr. Luis Fernando Santoyo / Dr. Celia Hernandez / Madisyn Hyde / Maira Prieto Team Member Xochilt Sanford / Marycarmen Boyce Management Team Member Dorice Saint / Pita Drake / Goldy Gill / Tricia Vu   Other (Discipline and Name) Sigmund - Group Facilitator   Patient/Family Present   Patient Present No   Patient's Family Present No

## 2023-10-17 NOTE — CASE MANAGEMENT
CM and Provider completed Prior Auth for Vraylar 3mg on Covermymeds to pt [employer]. Response to be given in 24-48 hours. 10/18/23 11:46am   CM checked covermymeds and site showed APPROVAL for pt Vraylar.

## 2023-10-17 NOTE — DISCHARGE SUMMARY
Discharge Summary - Jess 44 y.o. female MRN: [id number]  Unit/Bed#: [facility] 245-86 Encounter: [id number]     Admission Date: 9/28/2023         Discharge Date: 10/18/23    Attending Psychiatrist: Kimmie Rudolph*    Reason for Admission/HPI:     Per HPI from admission H&P obtained by Dr. Eduardo Corona on 9/29/23:    "Per ED provider on 9/28:"Patient is 44year old female who presents with worsening anxiety and depression. Patient reports that she has been struggling since having the baby. She has a history of anxiety and depression that has been worsening despite an inpatient hospitalization and multiple medication adjustments. Reports that her psychiatrist recommended that she go inpatient at this time. Patient denies any suicidal or homicidal ideations. She denies any auditory visual hallucinations. She states that her anxiety has made her debilitated and she feels like she cannot do anything. She states that she is not bonding with her child, but does not have any thoughts of harming the child."     Per Crisis worker on 9/28:"43 y.o female presented to the ED with depression and anxiety. Pt stated worsening depression and anxiety since the birth of her child for the past year and a half. Pt stated she has no motivation and energy to do daily task and to take care of her child. Pt's has a supportive  who helps take care of the Child. Patient loss of employment is another stressor. Pt stated she had trauma as a child. Pt stated she hasn't been taking her psych medications for the past two months. Pt stated her medcations have not been effective since the birth of her child. Pt denies SI/HI and A/V hallucinations. Pt was inpatient for mental health x2 at [facility] where pt was diagnosed with Bipolar. Pt currently has no outpatient Psychiatrist or Therapist. Pt denies any legal or substance abuse.   Pt stated she wants to go inpatient and will sign a 12 Provider is in agreement with the 201 for inpatient treatment."     This is 45 yo female with hx of Bipolar disorder admitted to inpatient unit on voluntary status for worsening of mood in the context of non compliance and resistance to treatment. Patient reports mental health issues since the age of 17. It started with depression, lasted for 6 months. She was on 2 antidepressants which resulted in manic episode. She was hospitalized few times at that time, then stabilized for 10 years on Lamictal 50 mg and Pristiq. Medications were discontinued during pregnancy a year ago and then restarted after delivery of the baby. The medications did not help and she eventually stopped them few months ago. Patient endorses depressed mood, anhedonia, lack of motivation, low energy, poor concentration and poor sleep. She also endorses anxiety and panic attacks. Denies any symptoms of gael or psychosis."      Social History       Tobacco History       Smoking Status  Never      Smokeless Tobacco Use  Never              Alcohol History       Alcohol Use Status  Not Currently              Drug Use       Drug Use Status  Not Currently              Sexual Activity       Sexually Active  Not Asked              Activities of Daily Living    Not Asked                 Additional Substance Use Detail       Questions Responses    Problems Due to Past Use of Alcohol? No    Problems Due to Past Use of Substances?  No    Substance Use Assessment Denies substance use within the past 12 months    Alcohol Use Frequency Denies use in past 12 months    Cannabis frequency Never used    Comment:  Never used on 9/29/2023     Heroin Frequency Denies use in past 12 months    Cocaine frequency Never used    Comment:  Never used on 9/29/2023     Crack Cocaine Frequency Denies use in past 12 months    Methamphetamine Frequency Denies use in past 12 months    Narcotic Frequency Denies use in past 12 months    Benzodiazepine Frequency Denies use in past 12 months Amphetamine frequency Denies use in past 12 months    Barbituate Frequency Denies use use in past 12 months    Inhalant frequency Never used    Comment:  Never used on 9/29/2023     Hallucinogen frequency Never used    Comment:  Never used on 9/29/2023     Ecstasy frequency Never used    Comment:  Never used on 9/29/2023     Other drug frequency Never used    Comment:  Never used on 9/29/2023     Opiate frequency Denies use in past 12 months    Last reviewed by Cecil Dyer RN on 9/29/2023            Past Medical History:   Diagnosis Date    Psychiatric disorder      No past surgical history on file. Medications: All current active medications have been reviewed. Allergies: Allergies   Allergen Reactions    Amoxicillin Itching and Vomiting    Vancomycin Itching     Scalp started itching and burning, redness in the forehead and neck    Clindamycin Itching    Beta Adrenergic Blockers Other (See Comments)       Objective     Vital signs in last 24 hours:    Temp:  [97.6 °F (36.4 °C)-97.9 °F (36.6 °C)] 97.9 °F (36.6 °C)  HR:  [66-85] 66  Resp:  [16-18] 18  BP: (105-118)/(82-85) 118/82    No intake or output data in the 24 hours ending 10/18/23 [address]:     Silas Jimenez was admitted to the inpatient psychiatric unit and started on [facility] checks every 7 minutes. During the hospitalization she was encouraged to attend individual therapy, group therapy, milieu therapy and occupational therapy. Psychiatric medications were adjusted over the hospital stay. To address depressive symptoms, anxiety symptoms, and insomnia, Silas Jimenez was treated with antidepressant Pristiq and Trazodone, mood stabilizer Depakote ER and Lamictal, antipsychotic medication Vraylar, anxiolytic medication Propranolol, and hypnotic medication Ambien. Medication doses were adjusted during the hospital course. Pristiq was added at the dose of 50mg daily .  Trazodone was added and titrated to 150mg qhs for sleep but ultimately discontinued in favor of Ambien . Ambien  was added at the dose of 5mg qhs with better efficacy for sleep . Lamictal was added at the dose of 25mg daily for treatment of bipolar depression but stopped after only 2 doses due to patient reports of "UTI symptoms" from the medication . Depakote ER was started instead at the dose of 500mg qhs. On that dose of Depakote ER, the VPA level was 42 on 10/16/23. Though this level was slightly subtherapeutic, the patient had demonstrated an excellent response so the dose was deemed clinically therapeutic. Willene Rubén was added and titrated to 3mg daily for bipolar depression . Propranolol  LA was continued at 60mg daily . Prior to beginning of treatment medications risks and benefits and possible side effects including risk of rash related to treatment with Lamictal, risk of liver impairment related to treatment with Depakote, risk of parkinsonian symptoms, Tardive Dyskinesia and metabolic syndrome related to treatment with antipsychotic medications, risk of suicidality and serotonin syndrome related to treatment with antidepressants, and risk of impaired next-day mental alertness, complex sleep-related behavior and dependence related to treatment with hypnotic medications were reviewed with Jennet Homans. She verbalized understanding and agreement for treatment. Upon admission Jennet Homans was seen by medical service for medical clearance for inpatient treatment and medical follow up. Wills symptoms slowly improved over the hospital course. Initially after admission she was still feeling depressed and anxious. With adjustment of medications and therapeutic milieu her symptoms gradually improved. At the end of treatment Jennet Homans was doing well. Her mood was more stable at the time of discharge. Jennet Homans denied suicidal ideation, intent or plan at the time of discharge and denied homicidal ideation, intent or plan at the time of discharge.  Jennet Homans was participating appropriately in milieu at the time of discharge. Behavior was appropriate on the unit at the time of discharge. Sleep and appetite were improved. Yasemin Brown was tolerating medications and was not reporting any significant side effects at the time of discharge. We felt that at the end of the hospital stay Yasemin Brown was at baseline and was ready for discharge. Yasemin Brown also felt stable and ready for discharge at the end of the hospital stay. The outpatient follow up with Dr. Kasey Hanson was arranged by the unit  upon discharge. Mental Status at Time of Discharge:     Appearance: casually dressed, appears consistent with stated age, normal grooming  Motor: no psychomotor disturbances, no gait abnormalities  Behavior: calm, cooperative, interacts with this writer appropriately  Speech: normal rate, rhythm, and volume  Mood: "good" less depressed  Affect: brighter, more appropriate, mood-congruent  Thought Process: organized, linear, and goal-oriented; intact associations  Thought Content: denies any delusional material, no preoccupation  Perception: denies any auditory or visual hallucinations, denies other perceptual disturbances  Risk Potential: denies suicidal ideation, plan, or intent.  Denies homicidal ideation  Sensorium: Oriented to person, place, time, and situation  Cognition: cognitive ability appears intact but was not quantitatively tested  Consciousness: alert and awake  Attention/Concentration: able to focus without difficulty, attention and concentration are age appropriate  Insight: improved  Judgement: improved    Admission Diagnosis:    Principal Problem:    Bipolar disorder ([address])  Active Problems:    Hypertension    Hypercholesteremia    Generalized anxiety disorder with panic attacks      Discharge Diagnosis:     Principal Problem:    Bipolar disorder ([address])  Active Problems:    Hypertension    Hypercholesteremia    Generalized anxiety disorder with panic attacks  Resolved Problems:    BV (bacterial vaginosis)    Medical clearance for psychiatric admission      Lab Results: I have personally reviewed all pertinent laboratory/tests results. Most Recent Labs:   Lab Results   Component Value Date    WBC 7.85 10/16/2023    RBC 4.64 10/16/2023    HGB 13.7 10/16/2023    HCT 42.6 10/16/2023     10/16/2023    RDW 13.8 10/16/2023    NEUTROABS 4.29 10/16/2023    SODIUM 139 10/17/2023    K 4.3 10/17/2023     10/17/2023    CO2 26 10/17/2023    BUN 23 10/17/2023    CREATININE 1.38 (H) 10/17/2023    GLUC 86 10/17/2023    GLUF 86 10/17/2023    CALCIUM 9.3 10/17/2023    AST 17 10/16/2023    ALT 9 10/16/2023    ALKPHOS 66 10/16/2023    TP 7.2 10/16/2023    ALB 4.0 10/16/2023    TBILI 0.41 10/16/2023    CHOLESTEROL 234 (H) 09/29/2023    HDL 68 09/29/2023    TRIG 70 09/29/2023    LDLCALC 152 (H) 09/29/2023    NONHDLC 166 09/29/2023    VALPROICTOT 42 (L) 10/16/2023    AQT0BZINYYYM 1.508 09/29/2023       Discharge Medications:    See after visit summary for all reconciled discharge medications provided to patient and family. Discharge instructions/Information to patient and family:     See after visit summary for information provided to patient and family. Provisions for Follow-Up Care:    See after visit summary for information related to follow-up care and any pertinent home health orders. Discharge Statement:    I spent 37 minutes discharging the patient. This time was spent on the day of discharge. I had direct contact with the patient on the day of discharge. Additional documentation is required if more than 30 minutes were spent on discharge:    I reviewed with Charlotte Osuna importance of compliance with medications and outpatient treatment after discharge. I discussed the medication regimen and possible side effects of the medications with Charlotte Osuna prior to discharge. At the time of discharge she was tolerating psychiatric medications. I discussed outpatient follow up with Charlotte Osuna.   I reviewed with Ashia Eddy crisis plan and safety plan upon discharge. Ashia Eddy was competent to understand risks and benefits of withholding information and risks and benefits of her actions.   Ashia Eddy has been filing controlled prescriptions on time as prescribed according to Franko1 Andrez Galarza.    Discharge on Two Antipsychotic Medications : Tasneem Araujo PA-C 10/18/23

## 2023-10-17 NOTE — CASE MANAGEMENT
CM called pt  Steve Gonzalez ([phone]) and left  providing him with an update on pt and that she will dc tomorrow. CM asked if he can pick pt up. CM informed him that pt has follow up appts with her OP Provider for Medication Management on Monday 10/23/23 at 3pm and Therapy on 10/26/23 at 2pm.     CM requested call back if he has any questions.

## 2023-10-17 NOTE — BH TRANSITION RECORD
Contact Information: If you have any questions, concerns, pended studies, tests and/or procedures, or emergencies regarding your inpatient behavioral health visit. Please contact [facility] [phone] and ask to speak to a , nurse or physician. A contact is available 24 hours/ 7 days a week at this number. Summary of Procedures Performed During your Stay:  Below is a list of major procedures performed during your hospital stay and a summary of results:  - No major procedures performed. Pending Studies (From admission, onward)      None          Please follow up on the above pending studies with your PCP and/or referring provider.

## 2023-10-17 NOTE — CASE MANAGEMENT
CM met with pt to check in about dc plans for tomorrow. Pt was in bed reading. Pt reported she does feel improved, especially in the last few days. Pt reported she does have some anxiety. CM spoke to pt about PHP and provided her with paperwork for [employer]. Pt reported she does not think that wants PHP but will think about it. CM informed pt that CM and provider submitted prior auth for her Laura Flores with her insurance and should hear response by tomorrow. CM spoke to pt about getting her scheduled with  OP provider and pt reported "I was going to Dr. Soni Patel in [place], last time I was there they said they couldn't help me and said I need to go to hospital."     CM spoke to pt about this and that CM can call them to see if they would take her back since pt did receive inpatient care for a significant amount of time. Pt in agreement with that and signed JACQUELINE for CM to contact them to schedule follow up appts. Pt reported she speaks to her  every evening and reported "he told me not to come home until I'm feeling better." CM informed pt that she can speak to  about dc and that anytime after 10am is good. Pt also requested that CM contact him to provide him with an update and to confirm that he can pick her up tomorrow.

## 2023-10-17 NOTE — NURSING NOTE
F/U ativan 1mg Po. Pt reports it was mildly effective. Given for "jittery" feelings and increased anxiety.

## 2023-10-18 VITALS
HEART RATE: 66 BPM | SYSTOLIC BLOOD PRESSURE: 118 MMHG | BODY MASS INDEX: 26.85 KG/M2 | DIASTOLIC BLOOD PRESSURE: 82 MMHG | WEIGHT: 142.2 LBS | OXYGEN SATURATION: 98 % | TEMPERATURE: 97.9 F | HEIGHT: 61 IN | RESPIRATION RATE: 18 BRPM

## 2023-10-18 RX ORDER — LISINOPRIL 10 MG/1
10 TABLET ORAL DAILY
Qty: 30 TABLET | Refills: 0 | Status: SHIPPED | OUTPATIENT
Start: 2023-10-18 | End: 2023-10-18 | Stop reason: SDUPTHER

## 2023-10-18 RX ORDER — LORAZEPAM 1 MG/1
1 TABLET ORAL EVERY 8 HOURS PRN
Qty: 10 TABLET | Refills: 0 | Status: SHIPPED | OUTPATIENT
Start: 2023-10-18 | End: 2023-10-18 | Stop reason: SDUPTHER

## 2023-10-18 RX ORDER — DIVALPROEX SODIUM 500 MG/1
500 TABLET, EXTENDED RELEASE ORAL
Qty: 30 TABLET | Refills: 1 | Status: SHIPPED | OUTPATIENT
Start: 2023-10-18 | End: 2023-10-18 | Stop reason: SDUPTHER

## 2023-10-18 RX ORDER — LORAZEPAM 1 MG/1
1 TABLET ORAL EVERY 8 HOURS PRN
Qty: 10 TABLET | Refills: 0 | Status: SHIPPED | OUTPATIENT
Start: 2023-10-18 | End: 2023-10-28

## 2023-10-18 RX ORDER — DIVALPROEX SODIUM 500 MG/1
500 TABLET, EXTENDED RELEASE ORAL
Qty: 30 TABLET | Refills: 1 | Status: SHIPPED | OUTPATIENT
Start: 2023-10-18 | End: 2023-12-17

## 2023-10-18 RX ORDER — ZOLPIDEM TARTRATE 5 MG/1
5 TABLET ORAL
Qty: 30 TABLET | Refills: 0 | Status: SHIPPED | OUTPATIENT
Start: 2023-10-18 | End: 2023-10-18 | Stop reason: SDUPTHER

## 2023-10-18 RX ORDER — PROPRANOLOL HCL 60 MG
60 CAPSULE, EXTENDED RELEASE 24HR ORAL DAILY
Qty: 30 CAPSULE | Refills: 0 | Status: SHIPPED | OUTPATIENT
Start: 2023-10-18 | End: 2023-11-17

## 2023-10-18 RX ORDER — DESVENLAFAXINE SUCCINATE 50 MG/1
50 TABLET, EXTENDED RELEASE ORAL DAILY
Qty: 30 TABLET | Refills: 1 | Status: SHIPPED | OUTPATIENT
Start: 2023-10-18 | End: 2023-10-18 | Stop reason: SDUPTHER

## 2023-10-18 RX ORDER — GABAPENTIN 300 MG/1
300 CAPSULE ORAL 3 TIMES DAILY
Qty: 90 CAPSULE | Refills: 1 | Status: SHIPPED | OUTPATIENT
Start: 2023-10-18 | End: 2023-12-17

## 2023-10-18 RX ORDER — PROPRANOLOL HCL 60 MG
60 CAPSULE, EXTENDED RELEASE 24HR ORAL DAILY
Qty: 30 CAPSULE | Refills: 0 | Status: SHIPPED | OUTPATIENT
Start: 2023-10-18 | End: 2023-10-18 | Stop reason: SDUPTHER

## 2023-10-18 RX ORDER — GABAPENTIN 300 MG/1
300 CAPSULE ORAL 3 TIMES DAILY
Qty: 90 CAPSULE | Refills: 1 | Status: SHIPPED | OUTPATIENT
Start: 2023-10-18 | End: 2023-10-18 | Stop reason: SDUPTHER

## 2023-10-18 RX ORDER — DESVENLAFAXINE SUCCINATE 50 MG/1
50 TABLET, EXTENDED RELEASE ORAL DAILY
Qty: 30 TABLET | Refills: 1 | Status: SHIPPED | OUTPATIENT
Start: 2023-10-18 | End: 2023-12-17

## 2023-10-18 RX ORDER — ZOLPIDEM TARTRATE 5 MG/1
5 TABLET ORAL
Qty: 30 TABLET | Refills: 0 | Status: SHIPPED | OUTPATIENT
Start: 2023-10-18 | End: 2023-11-17

## 2023-10-18 RX ORDER — LISINOPRIL 10 MG/1
10 TABLET ORAL DAILY
Qty: 30 TABLET | Refills: 0 | Status: SHIPPED | OUTPATIENT
Start: 2023-10-18 | End: 2023-11-17

## 2023-10-18 RX ADMIN — CARIPRAZINE 3 MG: 3 CAPSULE, GELATIN COATED ORAL at 08:51

## 2023-10-18 RX ADMIN — LORAZEPAM 1 MG: 1 TABLET ORAL at 10:13

## 2023-10-18 RX ADMIN — DESVENLAFAXINE 50 MG: 50 TABLET, FILM COATED, EXTENDED RELEASE ORAL at 08:51

## 2023-10-18 RX ADMIN — PROPRANOLOL HYDROCHLORIDE 60 MG: 60 CAPSULE, EXTENDED RELEASE ORAL at 08:51

## 2023-10-18 RX ADMIN — GABAPENTIN 300 MG: 300 CAPSULE ORAL at 08:51

## 2023-10-18 RX ADMIN — LISINOPRIL 10 MG: 10 TABLET ORAL at 08:51

## 2023-10-18 NOTE — NURSING NOTE
Pt expressed readiness for discharge, AVS reviewed and the patient denied any questions or concerns. Reports feeling safe and able to use coping skills upon departure from this unit. Patient walked out of facility safely by staff with belongings.

## 2023-10-18 NOTE — CASE MANAGEMENT
CM called pt [phone] [facility] OP Provider Dr. Greta Scott's office ([phone]) and informed staff that pt has been hospitalized for past 20 days and has made improvement. CM asked if pt can be scheduled for follow up since she will dc tomorrow. Staff placed CM on hold to speak to pt Provider and reported "we can take her back as long as we get her discharge paperwork."    Pt has been scheduled for:    In person Appointment with Grzegorz Tucker for Medication Management on 10/23/23 @ 3pm   In Person Therapy appointment with Erica Carson on 10/26/23 @ 2pm     CM will fax dc summary to requested fax# [phone]

## 2023-10-18 NOTE — PLAN OF CARE
Problem: Ineffective Coping  Goal: Identifies ineffective coping skills  Outcome: Adequate for Discharge  Goal: Identifies healthy coping skills  Outcome: Adequate for Discharge  Goal: Demonstrates healthy coping skills  Outcome: Adequate for Discharge  Goal: Participates in unit activities  Description: Interventions:  - Provide therapeutic environment   - Provide required programming   - Redirect inappropriate behaviors   Outcome: Adequate for Discharge     Problem: Depression  Goal: Attend and participate in unit activities, including therapeutic, recreational, and educational groups  Description: Interventions:  - Provide therapeutic and educational activities daily, encourage attendance and participation, and document same in the medical record   Outcome: Adequate for Discharge

## 2023-10-18 NOTE — PLAN OF CARE
Problem: Ineffective Coping  Goal: Identifies ineffective coping skills  Outcome: Progressing  Goal: Identifies healthy coping skills  Outcome: Progressing  Goal: Demonstrates healthy coping skills  Outcome: Progressing  Goal: Participates in unit activities  Description: Interventions:  - Provide therapeutic environment   - Provide required programming   - Redirect inappropriate behaviors   Outcome: Progressing  Goal: Patient/Family participate in treatment and DC plans  Description: Interventions:  - Provide therapeutic environment  Outcome: Progressing  Goal: Patient/Family verbalizes awareness of resources  Outcome: Progressing  Goal: Understands least restrictive measures  Description: Interventions:  - Utilize least restrictive behavior  Outcome: Progressing  Goal: Free from restraint events  Description: - Utilize least restrictive measures   - Provide behavioral interventions   - Redirect inappropriate behaviors   Outcome: Progressing     Problem: Depression  Goal: Treatment Goal: Demonstrate behavioral control of depressive symptoms, verbalize feelings of improved mood/affect, and adopt new coping skills prior to discharge  Outcome: Progressing  Goal: Verbalize thoughts and feelings  Description: Interventions:  - Assess and re-assess patient's level of risk   - Engage patient in 1:1 interactions, daily, for a minimum of 15 minutes   - Encourage patient to express feelings, fears, frustrations, hopes   Outcome: Progressing  Goal: Refrain from harming self  Description: Interventions:  - Monitor patient closely, per order   - Supervise medication ingestion, monitor effects and side effects   Outcome: Progressing  Goal: Refrain from isolation  Description: Interventions:  - Develop a trusting relationship   - Encourage socialization   Outcome: Progressing  Goal: Refrain from self-neglect  Outcome: Progressing  Goal: Attend and participate in unit activities, including therapeutic, recreational, and educational groups  Description: Interventions:  - Provide therapeutic and educational activities daily, encourage attendance and participation, and document same in the medical record   Outcome: Progressing  Goal: Complete daily ADLs, including personal hygiene independently, as able  Description: Interventions:  - Observe, teach, and assist patient with ADLS  -  Monitor and promote a balance of rest/activity, with adequate nutrition and elimination   Outcome: Progressing     Problem: Anxiety  Goal: Anxiety is at manageable level  Description: Interventions:  - Assess and monitor patient's anxiety level. - Monitor for signs and symptoms (heart palpitations, chest pain, shortness of breath, headaches, nausea, feeling jumpy, restlessness, irritable, apprehensive). - Collaborate with interdisciplinary team and initiate plan and interventions as ordered.   - Clarence patient to unit/surroundings  - Explain treatment plan  - Encourage participation in care  - Encourage verbalization of concerns/fears  - Identify coping mechanisms  - Assist in developing anxiety-reducing skills  - Administer/offer alternative therapies  - Limit or eliminate stimulants  Outcome: Progressing     Problem: DISCHARGE PLANNING  Goal: Discharge to home or other facility with appropriate resources  Description: INTERVENTIONS:  - Identify barriers to discharge w/patient and caregiver  - Arrange for needed discharge resources and transportation as appropriate  - Identify discharge learning needs (meds, wound care, etc.)  - Arrange for interpretive services to assist at discharge as needed  - Refer to Case Management Department for coordinating discharge planning if the patient needs post-hospital services based on physician/advanced practitioner order or complex needs related to functional status, cognitive ability, or social support system  Outcome: Progressing

## 2023-10-18 NOTE — PROGRESS NOTES
Pt reported feeling improved and ready to dc home today. DC: Today -  will pick her up    10/18/23 0752   Team Meeting   Meeting Type Daily Rounds   Team Members Present   Team Members Present Physician;Nurse;; Other (Discipline and Name)   Physician Team Member Dr. Darcy Luis / Dr. Senait Morales / Karlene Pan / Dallie Prader Team Member Vianca Valdez / Bo Odell Management Team Member Lidia Fields / Viviane Franco / Audra Rodriguez   Other (Discipline and Name) Sigmund - Group Facilitator   Patient/Family Present   Patient Present No   Patient's Family Present No

## 2023-10-18 NOTE — PLAN OF CARE
Problem: Ineffective Coping  Goal: Identifies ineffective coping skills  Outcome: Adequate for Discharge  Goal: Identifies healthy coping skills  Outcome: Adequate for Discharge  Goal: Demonstrates healthy coping skills  Outcome: Adequate for Discharge  Goal: Participates in unit activities  Description: Interventions:  - Provide therapeutic environment   - Provide required programming   - Redirect inappropriate behaviors   Outcome: Adequate for Discharge  Goal: Patient/Family participate in treatment and DC plans  Description: Interventions:  - Provide therapeutic environment  Outcome: Adequate for Discharge  Goal: Patient/Family verbalizes awareness of resources  Outcome: Adequate for Discharge  Goal: Understands least restrictive measures  Description: Interventions:  - Utilize least restrictive behavior  Outcome: Adequate for Discharge  Goal: Free from restraint events  Description: - Utilize least restrictive measures   - Provide behavioral interventions   - Redirect inappropriate behaviors   Outcome: Adequate for Discharge     Problem: Depression  Goal: Treatment Goal: Demonstrate behavioral control of depressive symptoms, verbalize feelings of improved mood/affect, and adopt new coping skills prior to discharge  Outcome: Adequate for Discharge  Goal: Verbalize thoughts and feelings  Description: Interventions:  - Assess and re-assess patient's level of risk   - Engage patient in 1:1 interactions, daily, for a minimum of 15 minutes   - Encourage patient to express feelings, fears, frustrations, hopes   Outcome: Adequate for Discharge  Goal: Refrain from harming self  Description: Interventions:  - Monitor patient closely, per order   - Supervise medication ingestion, monitor effects and side effects   Outcome: Adequate for Discharge  Goal: Refrain from isolation  Description: Interventions:  - Develop a trusting relationship   - Encourage socialization   Outcome: Adequate for Discharge  Goal: Refrain from self-neglect  Outcome: Adequate for Discharge  Goal: Attend and participate in unit activities, including therapeutic, recreational, and educational groups  Description: Interventions:  - Provide therapeutic and educational activities daily, encourage attendance and participation, and document same in the medical record   Outcome: Adequate for Discharge  Goal: Complete daily ADLs, including personal hygiene independently, as able  Description: Interventions:  - Observe, teach, and assist patient with ADLS  -  Monitor and promote a balance of rest/activity, with adequate nutrition and elimination   Outcome: Adequate for Discharge     Problem: Anxiety  Goal: Anxiety is at manageable level  Description: Interventions:  - Assess and monitor patient's anxiety level. - Monitor for signs and symptoms (heart palpitations, chest pain, shortness of breath, headaches, nausea, feeling jumpy, restlessness, irritable, apprehensive). - Collaborate with interdisciplinary team and initiate plan and interventions as ordered.   - Fowler patient to unit/surroundings  - Explain treatment plan  - Encourage participation in care  - Encourage verbalization of concerns/fears  - Identify coping mechanisms  - Assist in developing anxiety-reducing skills  - Administer/offer alternative therapies  - Limit or eliminate stimulants  Outcome: Adequate for Discharge     Problem: DISCHARGE PLANNING  Goal: Discharge to home or other facility with appropriate resources  Description: INTERVENTIONS:  - Identify barriers to discharge w/patient and caregiver  - Arrange for needed discharge resources and transportation as appropriate  - Identify discharge learning needs (meds, wound care, etc.)  - Arrange for interpretive services to assist at discharge as needed  - Refer to Case Management Department for coordinating discharge planning if the patient needs post-hospital services based on physician/advanced practitioner order or complex needs related to functional status, cognitive ability, or social support system  Outcome: Adequate for Discharge

## 2023-10-18 NOTE — NURSING NOTE
Amy Valentin reports feeling ready to d/c, "I'm ready to take on the world." She denies all psych symptoms at this time. No maladaptive bx's noted. She is compliant with medication administration and denies side effects. We reviewed therapeutic coping skills she plans to utilize upon d/c. Q 7 min safety checks maintained throughout shift.

## 2023-10-18 NOTE — PROGRESS NOTES
10/18/23 0915   Activity/Group Checklist   Group Admission/Discharge  (Relapse prevention plan)   Attendance Attended   Attendance Duration (min) 0-15   Interactions Interacted appropriately   Affect/Mood Appropriate   Goals Achieved Identified relapse prevention strategies  (Completed relapse prevention plan with this writer. Identified signs, symptoms, coping strategies, and supports.  Discussed resources for relapse prevention and management.)

## 2024-01-22 NOTE — NURSING NOTE
Yuki Klein reports interrupted sleep d/t roommate activity. She reports her sleep and mood has improved since starting depakote. Ildefonso any improvement with anxiety. Requested and received PRN ativan 1mg po for increased "jittery" feelings and anxiety. Denies SI/HI and hallucinations. No